# Patient Record
Sex: MALE | Race: WHITE | NOT HISPANIC OR LATINO | Employment: FULL TIME | URBAN - METROPOLITAN AREA
[De-identification: names, ages, dates, MRNs, and addresses within clinical notes are randomized per-mention and may not be internally consistent; named-entity substitution may affect disease eponyms.]

---

## 2021-01-14 ENCOUNTER — OFFICE VISIT (OUTPATIENT)
Dept: FAMILY MEDICINE CLINIC | Facility: CLINIC | Age: 56
End: 2021-01-14
Payer: COMMERCIAL

## 2021-01-14 VITALS
TEMPERATURE: 97.1 F | BODY MASS INDEX: 25.93 KG/M2 | HEART RATE: 47 BPM | DIASTOLIC BLOOD PRESSURE: 80 MMHG | HEIGHT: 71 IN | SYSTOLIC BLOOD PRESSURE: 120 MMHG | WEIGHT: 185.2 LBS | OXYGEN SATURATION: 98 %

## 2021-01-14 DIAGNOSIS — Z12.5 PROSTATE CANCER SCREENING: ICD-10-CM

## 2021-01-14 DIAGNOSIS — Z00.00 ANNUAL PHYSICAL EXAM: Primary | ICD-10-CM

## 2021-01-14 DIAGNOSIS — Z11.4 ENCOUNTER FOR SCREENING FOR HIV: ICD-10-CM

## 2021-01-14 DIAGNOSIS — E78.2 MIXED HYPERLIPIDEMIA: ICD-10-CM

## 2021-01-14 DIAGNOSIS — M54.12 CERVICAL RADICULOPATHY: ICD-10-CM

## 2021-01-14 DIAGNOSIS — Z11.59 NEED FOR HEPATITIS C SCREENING TEST: ICD-10-CM

## 2021-01-14 PROBLEM — Z98.890 H/O MELANOMA EXCISION: Status: ACTIVE | Noted: 2021-01-14

## 2021-01-14 PROBLEM — Z85.820 H/O MELANOMA EXCISION: Status: ACTIVE | Noted: 2021-01-14

## 2021-01-14 PROBLEM — Z80.8 FAMILY HISTORY OF SKIN CANCER: Status: ACTIVE | Noted: 2021-01-14

## 2021-01-14 PROCEDURE — 99386 PREV VISIT NEW AGE 40-64: CPT | Performed by: FAMILY MEDICINE

## 2021-01-14 PROCEDURE — 3008F BODY MASS INDEX DOCD: CPT | Performed by: FAMILY MEDICINE

## 2021-01-14 PROCEDURE — 1036F TOBACCO NON-USER: CPT | Performed by: FAMILY MEDICINE

## 2021-01-14 PROCEDURE — 3725F SCREEN DEPRESSION PERFORMED: CPT | Performed by: FAMILY MEDICINE

## 2021-01-14 RX ORDER — METAXALONE 800 MG/1
800 TABLET ORAL 3 TIMES DAILY
Qty: 90 TABLET | Refills: 0 | Status: SHIPPED | OUTPATIENT
Start: 2021-01-14 | End: 2021-05-05

## 2021-01-14 NOTE — PROGRESS NOTES
[address] [facility] [address]    NAME: Deric Menon  AGE: 54 y o  SEX: male  : 1965     DATE: 2021     Assessment and Plan:     Problem List Items Addressed This Visit        Other    Hyperlipidemia    Relevant Orders    Lipid Panel with Direct LDL reflex      Other Visit Diagnoses     Annual physical exam    -  Primary    Relevant Orders    Comprehensive metabolic panel    CBC and differential    Encounter for screening for HIV        Relevant Orders    HIV 1/2 Antigen/Antibody (4th Generation) w Reflex [facility]    Need for hepatitis C screening test        Relevant Orders    Hepatitis C antibody    Prostate cancer screening        Relevant Orders    PSA, total and free    Cervical radiculopathy        Relevant Medications    metaxalone (SKELAXIN) 800 mg tablet    Other Relevant Orders    XR spine cervical complete 4 or 5 vw non injury          Immunizations and preventive care screenings were discussed with patient today  Appropriate education was printed on patient's after visit summary  Counseling:  Alcohol/drug use: discussed moderation in alcohol intake, the recommendations for healthy alcohol use, and avoidance of illicit drug use  Dental Health: discussed importance of regular tooth brushing, flossing, and dental visits  Injury prevention: discussed safety/seat belts, safety helmets, smoke detectors, carbon dioxide detectors, and smoking near bedding or upholstery  Sexual health: discussed sexually transmitted diseases, partner selection, use of condoms, avoidance of unintended pregnancy, and contraceptive alternatives  · Exercise: the importance of regular exercise/physical activity was discussed  Recommend exercise 3-5 times per week for at least 30 minutes  BMI Counseling: Body mass index is 25 61 kg/m²   The BMI is above normal  Nutrition recommendations include decreasing portion sizes and encouraging healthy choices of fruits and vegetables  Exercise recommendations include moderate physical activity 150 minutes/week  No pharmacotherapy was ordered  No follow-ups on file  Chief Complaint:     Chief Complaint   Patient presents with    Physical Exam     Patient is here today for a new patient annual exam       History of Present Illness:     Adult Annual Physical   Patient here for a comprehensive physical exam  The patient reports no problems   [employer]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  · Diet/Nutrition: well balanced diet and consuming 3-5 servings of fruits/vegetables daily  · Exercise: biking stationary   Skiing,      Depression Screening  PHQ-9 Depression Screening    PHQ-9:   Frequency of the following problems over the past two weeks:      Little interest or pleasure in doing things: 0 - not at all  Feeling down, depressed, or hopeless: 0 - not at all  PHQ-2 Score: 0       General Health  · Sleep: sleeps well and gets 7-8 hours of sleep on average  · Hearing: normal - bilateral   · Vision: most recent eye exam <1 year ago and wears glasses  · Dental: regular dental visits and brushes teeth twice daily   Health  · Symptoms include: none     Review of Systems:     Review of Systems   Constitutional: Negative  HENT: Negative  Eyes: Negative  Respiratory: Negative  Cardiovascular: Negative  Gastrointestinal: Negative  Endocrine: Negative  Genitourinary: Negative  Musculoskeletal: Negative  Skin: Negative  Allergic/Immunologic: Negative  Neurological: Negative  Hematological: Negative  Psychiatric/Behavioral: Negative  Past Medical History:     History reviewed  No pertinent past medical history  Past Surgical History:     History reviewed  No pertinent surgical history     Family History:     Family History   Problem Relation Age of Onset    Cancer Father     Heart disease Father     Hypertension Father    Yvette Lee Hyperlipidemia Father     Hypertension Brother       Social History:        Social History     Socioeconomic History    Marital status: /Civil Union     Spouse name: None    Number of children: None    Years of education: None    Highest education level: None   Occupational History    None   Social Needs    Financial resource strain: None    Food insecurity     Worry: None     Inability: None    Transportation needs     Medical: None     Non-medical: None   Tobacco Use    Smoking status: Never Smoker    Smokeless tobacco: Never Used   Substance and Sexual Activity    Alcohol use: Yes     Comment: social    Drug use: Never    Sexual activity: Yes   Lifestyle    Physical activity     Days per week: None     Minutes per session: None    Stress: None   Relationships    Social connections     Talks on phone: None     Gets together: None     Attends Islam service: None     Active member of club or organization: None     Attends meetings of clubs or organizations: None     Relationship status: None    Intimate partner violence     Fear of current or ex partner: None     Emotionally abused: None     Physically abused: None     Forced sexual activity: None   Other Topics Concern    None   Social History Narrative    None      Current Medications:     Current Outpatient Medications   Medication Sig Dispense Refill    metaxalone (SKELAXIN) 800 mg tablet Take 1 tablet (800 mg total) by mouth 3 (three) times a day 90 tablet 0     No current facility-administered medications for this visit  Allergies: Allergies   Allergen Reactions    Amoxicillin-Pot Clavulanate Rash    Penicillins Rash      Physical Exam:     /80 (BP Location: Left arm, Patient Position: Sitting, Cuff Size: Adult)   Pulse (!) 47   Temp (!) 97 1 °F (36 2 °C) (Tympanic)   Ht 5' 11 3" (1 811 m)   Wt 84 kg (185 lb 3 2 oz)   SpO2 98%   BMI 25 61 kg/m²     Physical Exam  Vitals signs and nursing note reviewed  Constitutional:       Appearance: He is well-developed  HENT:      Head: Normocephalic and atraumatic  Eyes:      Conjunctiva/sclera: Conjunctivae normal    Neck:      Musculoskeletal: Neck supple  Cardiovascular:      Rate and Rhythm: Normal rate and regular rhythm  Heart sounds: No murmur  Pulmonary:      Effort: Pulmonary effort is normal  No respiratory distress  Breath sounds: Normal breath sounds  Abdominal:      Palpations: Abdomen is soft  Tenderness: There is no abdominal tenderness  Musculoskeletal: Normal range of motion  Skin:     General: Skin is warm and dry  Neurological:      Mental Status: He is alert            Maritza Elliott MD  [address] [facility]

## 2021-01-14 NOTE — PATIENT INSTRUCTIONS

## 2021-01-18 ENCOUNTER — APPOINTMENT (OUTPATIENT)
Dept: LAB | Facility: CLINIC | Age: 56
End: 2021-01-18
Payer: COMMERCIAL

## 2021-01-18 ENCOUNTER — TRANSCRIBE ORDERS (OUTPATIENT)
Dept: FAMILY MEDICINE CLINIC | Facility: HOSPITAL | Age: 56
End: 2021-01-18

## 2021-01-18 ENCOUNTER — TELEPHONE (OUTPATIENT)
Dept: ADMINISTRATIVE | Facility: OTHER | Age: 56
End: 2021-01-18

## 2021-01-18 DIAGNOSIS — E78.2 MIXED HYPERLIPIDEMIA: ICD-10-CM

## 2021-01-18 DIAGNOSIS — Z12.5 PROSTATE CANCER SCREENING: ICD-10-CM

## 2021-01-18 DIAGNOSIS — Z11.4 ENCOUNTER FOR SCREENING FOR HIV: ICD-10-CM

## 2021-01-18 DIAGNOSIS — Z11.59 NEED FOR HEPATITIS C SCREENING TEST: ICD-10-CM

## 2021-01-18 LAB
ALBUMIN SERPL BCP-MCNC: 3.8 G/DL (ref 3.5–5)
ALP SERPL-CCNC: 47 U/L (ref 46–116)
ALT SERPL W P-5'-P-CCNC: 30 U/L (ref 12–78)
ANION GAP SERPL CALCULATED.3IONS-SCNC: 9 MMOL/L (ref 4–13)
AST SERPL W P-5'-P-CCNC: 23 U/L (ref 5–45)
BASOPHILS # BLD AUTO: 0.04 THOUSANDS/ΜL (ref 0–0.1)
BASOPHILS NFR BLD AUTO: 1 % (ref 0–1)
BILIRUB SERPL-MCNC: 1.64 MG/DL (ref 0.2–1)
BUN SERPL-MCNC: 19 MG/DL (ref 5–25)
CALCIUM SERPL-MCNC: 8.8 MG/DL (ref 8.3–10.1)
CHLORIDE SERPL-SCNC: 105 MMOL/L (ref 100–108)
CHOLEST SERPL-MCNC: 244 MG/DL (ref 50–200)
CO2 SERPL-SCNC: 27 MMOL/L (ref 21–32)
CREAT SERPL-MCNC: 1.05 MG/DL (ref 0.6–1.3)
EOSINOPHIL # BLD AUTO: 0.21 THOUSAND/ΜL (ref 0–0.61)
EOSINOPHIL NFR BLD AUTO: 5 % (ref 0–6)
ERYTHROCYTE [DISTWIDTH] IN BLOOD BY AUTOMATED COUNT: 12.7 % (ref 11.6–15.1)
GFR SERPL CREATININE-BSD FRML MDRD: 80 ML/MIN/1.73SQ M
GLUCOSE P FAST SERPL-MCNC: 97 MG/DL (ref 65–99)
HCT VFR BLD AUTO: 47 % (ref 36.5–49.3)
HCV AB SER QL: NORMAL
HDLC SERPL-MCNC: 55 MG/DL
HGB BLD-MCNC: 16 G/DL (ref 12–17)
IMM GRANULOCYTES # BLD AUTO: 0.01 THOUSAND/UL (ref 0–0.2)
IMM GRANULOCYTES NFR BLD AUTO: 0 % (ref 0–2)
LDLC SERPL CALC-MCNC: 172 MG/DL (ref 0–100)
LYMPHOCYTES # BLD AUTO: 1.87 THOUSANDS/ΜL (ref 0.6–4.47)
LYMPHOCYTES NFR BLD AUTO: 45 % (ref 14–44)
MCH RBC QN AUTO: 32.4 PG (ref 26.8–34.3)
MCHC RBC AUTO-ENTMCNC: 34 G/DL (ref 31.4–37.4)
MCV RBC AUTO: 95 FL (ref 82–98)
MONOCYTES # BLD AUTO: 0.52 THOUSAND/ΜL (ref 0.17–1.22)
MONOCYTES NFR BLD AUTO: 13 % (ref 4–12)
NEUTROPHILS # BLD AUTO: 1.47 THOUSANDS/ΜL (ref 1.85–7.62)
NEUTS SEG NFR BLD AUTO: 36 % (ref 43–75)
NRBC BLD AUTO-RTO: 0 /100 WBCS
PLATELET # BLD AUTO: 241 THOUSANDS/UL (ref 149–390)
PMV BLD AUTO: 9.6 FL (ref 8.9–12.7)
POTASSIUM SERPL-SCNC: 4.1 MMOL/L (ref 3.5–5.3)
PROT SERPL-MCNC: 6.9 G/DL (ref 6.4–8.2)
RBC # BLD AUTO: 4.94 MILLION/UL (ref 3.88–5.62)
SODIUM SERPL-SCNC: 141 MMOL/L (ref 136–145)
TRIGL SERPL-MCNC: 85 MG/DL
WBC # BLD AUTO: 4.12 THOUSAND/UL (ref 4.31–10.16)

## 2021-01-18 PROCEDURE — 80061 LIPID PANEL: CPT

## 2021-01-18 PROCEDURE — 80053 COMPREHEN METABOLIC PANEL: CPT | Performed by: FAMILY MEDICINE

## 2021-01-18 PROCEDURE — 84153 ASSAY OF PSA TOTAL: CPT

## 2021-01-18 PROCEDURE — 36415 COLL VENOUS BLD VENIPUNCTURE: CPT | Performed by: FAMILY MEDICINE

## 2021-01-18 PROCEDURE — 86803 HEPATITIS C AB TEST: CPT

## 2021-01-18 PROCEDURE — 84154 ASSAY OF PSA FREE: CPT

## 2021-01-18 PROCEDURE — 85025 COMPLETE CBC W/AUTO DIFF WBC: CPT | Performed by: FAMILY MEDICINE

## 2021-01-18 PROCEDURE — 87389 HIV-1 AG W/HIV-1&-2 AB AG IA: CPT

## 2021-01-18 NOTE — LETTER
Procedure Request Form: Colonoscopy      Date Requested: 21  Patient: Radha Merchant  Patient : 1965   Referring Provider: Reeves Justice, MD        Date of Procedure ______________________________       The above patient has informed us that they have completed their   most recent Colonoscopy at your facility  Please complete   this form and attach all corresponding procedure reports/results  Comments __________________________________________________________  ____________________________________________________________________  ____________________________________________________________________  ____________________________________________________________________    Facility Completing Procedure _________________________________________    Form Completed By (print name) _______________________________________      Signature __________________________________________________________      These reports are needed for  compliance    Please fax this completed form and a copy of the procedure report to our office located at [place] [address] as soon as possible to [phone] haris Angel: Phone [phone]    We thank you for your assistance in treating our mutual patient

## 2021-01-18 NOTE — TELEPHONE ENCOUNTER
Upon review of the In Basket request and the patient's chart, initial outreach has been made via fax, please see Contacts section for details       Thank you  Rosio Lo MA

## 2021-01-18 NOTE — TELEPHONE ENCOUNTER
----- Message from Christian Jones sent at 1/14/2021 12:35 PM EST -----  01/14/21 12:36 PM    Hello, our patient Valentina Sandoval has had a Colorectal Cancer Screening completed/performed  Please assist in updating the patient chart by reaching out to Dr Kiran Juarez office  Office address is [address] [place] [address] and telephone number is [phone]      Thank you,  Christian Jones  [facility]

## 2021-01-19 LAB — HIV 1+2 AB+HIV1 P24 AG SERPL QL IA: NORMAL

## 2021-01-19 NOTE — TELEPHONE ENCOUNTER
Upon review of the In Basket request we were able to locate, review, and update the patient chart as requested for CRC: Colonoscopy  Any additional questions or concerns should be emailed to the Practice Liaisons via [email or web address] email, please do not reply via In Basket      Thank you  Viktoriya Lawrence MA

## 2021-01-20 DIAGNOSIS — D70.9 NEUTROPENIA, UNSPECIFIED TYPE (HCC): ICD-10-CM

## 2021-01-20 DIAGNOSIS — R74.8 ELEVATED LIVER ENZYMES: Primary | ICD-10-CM

## 2021-01-20 LAB
PSA FREE MFR SERPL: 36.7 %
PSA FREE SERPL-MCNC: 0.55 NG/ML
PSA SERPL-MCNC: 1.5 NG/ML (ref 0–4)

## 2021-01-21 ENCOUNTER — APPOINTMENT (OUTPATIENT)
Dept: RADIOLOGY | Age: 56
End: 2021-01-21
Payer: COMMERCIAL

## 2021-01-21 DIAGNOSIS — M54.12 CERVICAL RADICULOPATHY: ICD-10-CM

## 2021-01-21 PROCEDURE — 72050 X-RAY EXAM NECK SPINE 4/5VWS: CPT

## 2021-01-29 DIAGNOSIS — M54.12 CERVICAL RADICULOPATHY: Primary | ICD-10-CM

## 2021-03-03 ENCOUNTER — TRANSCRIBE ORDERS (OUTPATIENT)
Dept: PAIN MEDICINE | Facility: CLINIC | Age: 56
End: 2021-03-03

## 2021-03-03 ENCOUNTER — CONSULT (OUTPATIENT)
Dept: PAIN MEDICINE | Facility: CLINIC | Age: 56
End: 2021-03-03
Payer: COMMERCIAL

## 2021-03-03 VITALS
BODY MASS INDEX: 25.59 KG/M2 | WEIGHT: 185 LBS | SYSTOLIC BLOOD PRESSURE: 98 MMHG | DIASTOLIC BLOOD PRESSURE: 70 MMHG | HEART RATE: 56 BPM

## 2021-03-03 DIAGNOSIS — R29.898 LEFT HAND WEAKNESS: ICD-10-CM

## 2021-03-03 DIAGNOSIS — M50.120 CERVICAL DISC DISORDER WITH RADICULOPATHY OF MID-CERVICAL REGION: Primary | ICD-10-CM

## 2021-03-03 PROCEDURE — 99244 OFF/OP CNSLTJ NEW/EST MOD 40: CPT | Performed by: ANESTHESIOLOGY

## 2021-03-03 PROCEDURE — 1036F TOBACCO NON-USER: CPT | Performed by: ANESTHESIOLOGY

## 2021-03-03 RX ORDER — NAPROXEN 500 MG/1
500 TABLET ORAL 2 TIMES DAILY WITH MEALS
Qty: 30 TABLET | Refills: 0 | Status: SHIPPED | OUTPATIENT
Start: 2021-03-03 | End: 2021-05-05
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
## 2021-03-03 NOTE — PATIENT INSTRUCTIONS
Neck Exercises   WHAT YOU NEED TO KNOW:   Why is it important to do neck exercises? Neck exercises help reduce neck pain, and improve neck movement and strength  Neck exercises also help prevent long-term neck problems  What do I need to know about neck exercises? · Do the exercises every day,  or as often as directed by your healthcare provider  · Move slowly, gently, and smoothly  Avoid fast or jerky motions  · Stand and sit the way your healthcare provider shows you  Good posture may reduce your neck pain  Check your posture often, even when you are not doing your neck exercises  How do I perform neck exercises safely? · Exercise position:  You may sit or stand while you do neck exercises  Face forward  Your shoulders should be straight and relaxed, with a good posture  · Head tilts, forward and back:  Gently bow your head and try to touch your chin to your chest  Your healthcare provider may tell you to push on the back of your neck to help bow your head  Raise your chin back to the starting position  Tilt your head back as far as possible so you are looking up at the ceiling  Your healthcare provider may tell you to lift your chin to help tilt your head back  Return your head to the starting position  · Head tilts, side to side:  Tilt your head, bringing your ear toward your shoulder  Then tilt your head toward the other shoulder  · Head turns:  Turn your head to look over your shoulder  Tilt your chin down and try to touch it to your shoulder  Do not raise your shoulder to your chin  Face forward again  Do the same on the other side  · Head rolls:  Slowly bring your chin toward your chest  Next, roll your head to the right  Your ear should be positioned over your shoulder  Hold this position for 5 seconds  Roll your head back toward your chest and to the left into the same position  Hold for 5 seconds   Gently roll your head back and around in a clockwise [place] 3 times  Next, move your head in the reverse direction (counterclockwise) in a [place] 3 times  Do not shrug your shoulders upwards while you do this exercise  When should I contact my healthcare provider? · Your pain does not get better, or gets worse  · You have questions or concerns about your condition, care, or exercise program     CARE AGREEMENT:   You have the right to help plan your care  Learn about your health condition and how it may be treated  Discuss treatment options with your healthcare providers to decide what care you want to receive  You always have the right to refuse treatment  The above information is an  only  It is not intended as medical advice for individual conditions or treatments  Talk to your doctor, nurse or pharmacist before following any medical regimen to see if it is safe and effective for you  © Copyright [address] Information is for End User's use only and may not be sold, redistributed or otherwise used for commercial purposes   All illustrations and images included in CareNotes® are the copyrighted property of A D A M , Inc  or [address] [place] [address]

## 2021-03-10 ENCOUNTER — TELEPHONE (OUTPATIENT)
Dept: PAIN MEDICINE | Facility: CLINIC | Age: 56
End: 2021-03-10

## 2021-03-10 NOTE — TELEPHONE ENCOUNTER
"Received transmission on MR Machado that showed tachycardia/Atrial fib with RVR on Saturday. I called to check on MR Machado and spoke with his daughter in law. She stated [facility] had stopped taking all of his medication and he was \"talking out of his head.\" They done blood, which they stated was normal, work and gave him an enema and sent him home. He is still not well today and is still talking out of his head.Attached episode  " Already addressed in a referral task, pt aware

## 2021-03-10 NOTE — TELEPHONE ENCOUNTER
My computer  when I did the peer 2 peer  Please let him know that MRI was approved and ok to keep scheduled appt    Will call him with the results

## 2021-03-15 ENCOUNTER — EVALUATION (OUTPATIENT)
Dept: PHYSICAL THERAPY | Facility: REHABILITATION | Age: 56
End: 2021-03-15
Payer: COMMERCIAL

## 2021-03-15 ENCOUNTER — HOSPITAL ENCOUNTER (OUTPATIENT)
Dept: RADIOLOGY | Age: 56
Discharge: HOME | End: 2021-03-15
Payer: COMMERCIAL

## 2021-03-15 DIAGNOSIS — M50.120 CERVICAL DISC DISORDER WITH RADICULOPATHY OF MID-CERVICAL REGION: ICD-10-CM

## 2021-03-15 DIAGNOSIS — M50.120 CERVICAL DISC DISORDER WITH RADICULOPATHY OF MID-CERVICAL REGION: Primary | ICD-10-CM

## 2021-03-15 PROCEDURE — 72141 MRI NECK SPINE W/O DYE: CPT

## 2021-03-15 PROCEDURE — 97110 THERAPEUTIC EXERCISES: CPT

## 2021-03-15 PROCEDURE — G1004 CDSM NDSC: HCPCS

## 2021-03-15 PROCEDURE — 97161 PT EVAL LOW COMPLEX 20 MIN: CPT

## 2021-03-15 PROCEDURE — 97140 MANUAL THERAPY 1/> REGIONS: CPT

## 2021-03-15 NOTE — PROGRESS NOTES
PT Evaluation     Today's date: 3/15/2021  Patient name: Nelly Martins  : 1965  MRN: [id number]  Referring provider: Karla Mclean MD  Dx:   Encounter Diagnosis     ICD-10-CM    1  Cervical disc disorder with radiculopathy of mid-cervical region  M50 120 Ambulatory referral to Physical Therapy                  Assessment  Assessment details: Pt is a 54 y o  male who presents to PT for evaluation of L hand paresthesias  Pt reports hx of L handed N/T since Thanksiving 2020  Pt reports symptoms are primarily located on the ulnar border of the hand  He denies any neck pain or L UE pain  Pt demonstrates s/s consistent with cervical radiculopathy  He would benefit from skilled physical therapy in order to reduce impairments and maximize functional mobility  Pt was educated regarding physical therapy diagnosis and the recommended plan of care, as well as the potential risks and benefits of treatment  Impairments: impaired physical strength, lacks appropriate home exercise program and poor posture   Understanding of Dx/Px/POC: good   Prognosis: good    Goals  STG (3 weeks)  1  Pt to be I in HEP  2 Pt to reduce N/T following a night of sleep by 50%  LTG (By DC)  1 Pt to reduce paresthesias by > 75%  2  Pt to improve FOTO score to predicted dc value  3  Pt to manage symptoms independently  4  Pt to increase L  strength, triceps strength to 5/5      Plan  Patient would benefit from: skilled physical therapy  Planned therapy interventions: joint mobilization, manual therapy, neuromuscular re-education, patient education, postural training, massage, strengthening, stretching, therapeutic activities, therapeutic exercise, flexibility, functional ROM exercises, graded exercise, home exercise program, body mechanics training and activity modification  Frequency: 1x week  Duration in visits: 12  Duration in weeks: 8  Plan of Care beginning date: 3/15/2021  Plan of Care expiration date: 2021  Treatment plan discussed with: patient        Subjective    Etiology of symptoms: Pt reports onset of paresthesias on the ulnar border of the L hand  Pt reports onset of symptoms around th time of thanksgiving 2020  Patient additionally reports weakness with gripping  SINSS: Current pain 0/10  Pt reports no current pain  Aggravating factors: sleeping, turning head down and to the right; Relieving factors: new pillow  Occupation: Working, [place] of [employer] at [employer]  Exercise history: cycling 3-4x a week for 45 minutes to hour and a half    Primary functional impairments:  1  Sleeping  2   Gripping    Patient Goals:  "To get rid of the numbness and to get hand strength back "    Objective     Red Flags: Negative for night pain, unexplained weight loss, bowel or bladder dysfunction, saddle anesthesia, hx of Ca, fever, chills, N/V, changes in vision, Headaches, dizziness, gait disturbances      Neuro Screen:  Reflexes: 1+ biceps BL  Dermatomes: decreased sensation C8  Myotomes: weakness C7, C8  Ashraf's: neg    Range of Motion:    Cervical PROM  Flexion Min restriction   Extension Min restriction   R Lateral Flexion wnl   L Lateral Flexion wnl   R Rotation Min restriction   L Rotation Min restriction       Manual Muscle Testing:    Upper Quarter   Shoulder flexion BL 5/5   Shoulder extension    Shoulder abduction BL 5/5   Shoulder internal rotation    Shoulder external rotation    Elbow flexion L 4+/5 R 5/5   Elbow extension L 4+/5 R 5/5   Finger abduction / opposition 4-/5 (fifth digit) 5/5 remaining   Wrist extension BL 5/5   Wrist flexion L 4+/5 R 5/5   Wrist extension     strength L 4/5 R 5/5       Special Testing:  (+) Distraction, ULTT 3, deep neck flexor endurance test  (-) Compression, spurlings, repeated movements, cervical flexion rotation test    Joint Play:  Hypomobile C6-T7       Precautions: None      Manuals             Cervical                                                    Neuro Re-Ed DNF             Seated posture on pball             TB scap retraction on pball             Prone cervical retraction on pball                                                    Ther Ex             UBE             Chinmay bicep curl             [place] tricep press down             Putty abduction             Putty flexion             digiflex                                        Ther Activity                                       Gait Training                                       Modalities             Cervical tx

## 2021-03-18 ENCOUNTER — OFFICE VISIT (OUTPATIENT)
Dept: PHYSICAL THERAPY | Facility: REHABILITATION | Age: 56
End: 2021-03-18
Payer: COMMERCIAL

## 2021-03-18 DIAGNOSIS — M50.120 CERVICAL DISC DISORDER WITH RADICULOPATHY OF MID-CERVICAL REGION: Primary | ICD-10-CM

## 2021-03-18 PROCEDURE — 97110 THERAPEUTIC EXERCISES: CPT

## 2021-03-18 PROCEDURE — 97140 MANUAL THERAPY 1/> REGIONS: CPT

## 2021-03-18 PROCEDURE — 97112 NEUROMUSCULAR REEDUCATION: CPT

## 2021-03-18 NOTE — PROGRESS NOTES
Daily Note     Today's date: 3/18/2021  Patient name: Valentina Sandoval  : 1965  MRN: [id number]  Referring provider: Anastasiia Appiah MD  Dx:   Encounter Diagnosis     ICD-10-CM    1  Cervical disc disorder with radiculopathy of mid-cervical region  M50 120                   Subjective: Pt reports he has been compliant with HEP  States he woke up with some N/T when laying on his stomach last night  Objective: See treatment diary below      Assessment: Tolerated treatment well  Pt tolerated strength progression well without adverse effects or increase in pain  Reports improvements in neck stiffness following tx  Patient demonstrated fatigue post treatment, exhibited good technique with therapeutic exercises and would benefit from continued PT      Plan: Progress treatment as tolerated         Precautions: None      Manuals 3/18            UT stretching bl sd 4'            Cervical rot PROM sd 4'            Manual cervical tx sd 4'                         Neuro Re-Ed             DNF             Seated scap retraction on pball 5" x20 gtb            Seated cervical retraction on pball 5" x20            Prone cervical retraction on pball                                                    Ther Ex             UBE 3' / 3'            [place] bicep curl 12# 3x10            [place] tricep press down 10# 3x10            Putty abduction / ext 2x2' red            Putty flexion 2x1' red            digiflex                                        Ther Activity                                       Gait Training                                       Modalities             Cervical tx

## 2021-03-19 ENCOUNTER — TELEPHONE (OUTPATIENT)
Dept: PAIN MEDICINE | Facility: CLINIC | Age: 56
End: 2021-03-19

## 2021-03-19 NOTE — TELEPHONE ENCOUNTER
Left voice mail to go over MRI cervical spine results which shows disc bulging at C6-7 with mild stenosis but also disc bulge with left foraminal disc protrusion at C7-T1 which is likely etiology of the left hand symptoms  Depending on how he is doing, I will either have him continue physical therapy or proceed with a cervical epidural steroid injection

## 2021-03-22 ENCOUNTER — APPOINTMENT (OUTPATIENT)
Dept: PHYSICAL THERAPY | Facility: REHABILITATION | Age: 56
End: 2021-03-22
Payer: COMMERCIAL

## 2021-03-22 NOTE — TELEPHONE ENCOUNTER
S/w pt, advised of FQ's notation  Pt verbalized understanding  Pt states he has had slow but consistent improvement in symptoms, numbness in hand has decreased and cramping is intermittent  Pt states if FQ recommends , he would like to continue PT and hold off on an injection at this time  Please advise, thank you

## 2021-03-24 ENCOUNTER — OFFICE VISIT (OUTPATIENT)
Dept: PHYSICAL THERAPY | Facility: REHABILITATION | Age: 56
End: 2021-03-24
Payer: COMMERCIAL

## 2021-03-24 DIAGNOSIS — M50.120 CERVICAL DISC DISORDER WITH RADICULOPATHY OF MID-CERVICAL REGION: Primary | ICD-10-CM

## 2021-03-24 PROCEDURE — 97012 MECHANICAL TRACTION THERAPY: CPT

## 2021-03-24 PROCEDURE — 97112 NEUROMUSCULAR REEDUCATION: CPT

## 2021-03-24 PROCEDURE — 97110 THERAPEUTIC EXERCISES: CPT

## 2021-03-24 NOTE — PROGRESS NOTES
Daily Note     Today's date: 3/24/2021  Patient name: Chey Montemayor  : 1965  MRN: [id number]  Referring provider: Sharlene Rudd MD  Dx:   Encounter Diagnosis     ICD-10-CM    1  Cervical disc disorder with radiculopathy of mid-cervical region  M50 120                   Subjective: Pt reports numbness in L hand this visit pre tx  Objective: See treatment diary below      Assessment: Tolerated treatment well  Pt reports no change in numbness following tx  Pt demonstrates improved strength this visit  Patient exhibited good technique with therapeutic exercises and would benefit from continued PT  Plan: Progress treatment as tolerated  Precautions: None      Manuals 3/18 3/24           UT stretching bl sd 4' np           Cervical rot PROM sd 4' np           Manual cervical tx sd 4' np                        Neuro Re-Ed             DNF             Seated scap retraction on pball 5" x20 gtb 5"x20 gtb           Seated cervical retraction on pball 5" x20 5"x20           Prone cervical retraction on pball                                                    Ther Ex             UBE 3' / 3' 3' / 3'           [place] bicep curl 12# 3x10 12# 2x15           [place] tricep press down 10# 3x10 12# 2x10           Putty abduction / ext 2x2' red 2x2  5' red           Putty flexion 2x1' red 3x1' red           digiflex                                        Ther Activity                                       Gait Training                                       Modalities             Cervical tx  2x5'

## 2021-03-28 ENCOUNTER — IMMUNIZATIONS (OUTPATIENT)
Dept: FAMILY MEDICINE CLINIC | Facility: HOSPITAL | Age: 56
End: 2021-03-28

## 2021-03-28 DIAGNOSIS — Z23 ENCOUNTER FOR IMMUNIZATION: Primary | ICD-10-CM

## 2021-03-28 PROCEDURE — 91300 SARS-COV-2 / COVID-19 MRNA VACCINE (PFIZER-BIONTECH) 30 MCG: CPT

## 2021-03-28 PROCEDURE — 0001A SARS-COV-2 / COVID-19 MRNA VACCINE (PFIZER-BIONTECH) 30 MCG: CPT

## 2021-03-29 ENCOUNTER — OFFICE VISIT (OUTPATIENT)
Dept: PHYSICAL THERAPY | Facility: REHABILITATION | Age: 56
End: 2021-03-29
Payer: COMMERCIAL

## 2021-03-29 DIAGNOSIS — M50.120 CERVICAL DISC DISORDER WITH RADICULOPATHY OF MID-CERVICAL REGION: Primary | ICD-10-CM

## 2021-03-29 PROCEDURE — 97112 NEUROMUSCULAR REEDUCATION: CPT

## 2021-03-29 PROCEDURE — 97140 MANUAL THERAPY 1/> REGIONS: CPT

## 2021-03-29 PROCEDURE — 97110 THERAPEUTIC EXERCISES: CPT

## 2021-03-29 NOTE — PROGRESS NOTES
Daily Note     Today's date: 3/29/2021  Patient name: Angelique Perla  : 1965  MRN: [id number]  Referring provider: Tara Orozco MD  Dx:   Encounter Diagnosis     ICD-10-CM    1  Cervical disc disorder with radiculopathy of mid-cervical region  M50 120                   Subjective: Pt reports some soreness in arm following vaccination yesterday  States he does not have any N/T pre tx  Objective: See treatment diary below      Assessment: Tolerated treatment well  Patient demonstrated fatigue post treatment, exhibited good technique with therapeutic exercises and would benefit from continued PT      Plan: Progress treatment as tolerated  Precautions: None      Manuals 3/18 3/24 3/29          UT stretching bl sd 4' np SD 4'          Cervical rot PROM sd 4' np SD 4'          Manual cervical tx sd 4' np SD 4'                       Neuro Re-Ed             DNF             Seated scap retraction on pball 5" x20 gtb 5"x20 gtb 5"x30 gtb          Seated cervical retraction on pball 5" x20 5"x20 5"x30          Prone cervical retraction on pball                                                    Ther Ex             UBE 3' / 3' 3' / 3' 4' / 4'          [place] bicep curl 12# 3x10 12# 2x15 2x15 12#          [place] tricep press down 10# 3x10 12# 2x10 12# 2x10          Putty abduction / ext 2x2' red 2x2  5' red 2x2x5' red          Putty flexion 2x1' red 3x1' red 2x2' red          digiflex                                        Ther Activity                                       Gait Training                                       Modalities             Cervical tx  2x5'

## 2021-03-31 ENCOUNTER — OFFICE VISIT (OUTPATIENT)
Dept: PHYSICAL THERAPY | Facility: REHABILITATION | Age: 56
End: 2021-03-31
Payer: COMMERCIAL

## 2021-03-31 DIAGNOSIS — M50.120 CERVICAL DISC DISORDER WITH RADICULOPATHY OF MID-CERVICAL REGION: Primary | ICD-10-CM

## 2021-03-31 PROCEDURE — 97110 THERAPEUTIC EXERCISES: CPT

## 2021-03-31 PROCEDURE — 97112 NEUROMUSCULAR REEDUCATION: CPT

## 2021-03-31 PROCEDURE — 97140 MANUAL THERAPY 1/> REGIONS: CPT

## 2021-03-31 NOTE — PROGRESS NOTES
Daily Note     Today's date: 3/31/2021  Patient name: Nathalia Trimble  : 1965  MRN: [id number]  Referring provider: Rae Villalobos MD  Dx:   Encounter Diagnosis     ICD-10-CM    1  Cervical disc disorder with radiculopathy of mid-cervical region  M50 120                   Subjective: Pt reports symptom improvement this visit  Objective: See treatment diary below      Assessment: Tolerated treatment well  Some fatigue noted post treatment session  Patient exhibited good technique with therapeutic exercises and would benefit from continued PT      Plan: Progress treatment as tolerated  Precautions: None      Manuals 3/18 3/24 3/29 3/31         UT stretching bl sd 4' np SD 4' sd 4'         Cervical rot PROM sd 4' np SD 4' sd 4'         Manual cervical tx sd 4' np SD 4' sd 4'                      Neuro Re-Ed             DNF             Seated scap retraction on pball 5" x20 gtb 5"x20 gtb 5"x30 gtb 5"x30 gtb         Seated cervical retraction on pball 5" x20 5"x20 5"x30 5"x30         Prone cervical retraction on pball                                                    Ther Ex             UBE 3' / 3' 3' / 3' 4' / 4' 4' / 4'         [place] bicep curl 12# 3x10 12# 2x15 2x15 12# 2x15 12#         [place] tricep press down 10# 3x10 12# 2x10 12# 2x10 12# 2x10         Putty abduction / ext 2x2' red 2x2  5' red 2x2x5' red 2x2  5' red         Putty flexion 2x1' red 3x1' red 2x2' red 2x2' red         digiflex                                        Ther Activity                                       Gait Training                                       Modalities             Cervical tx  2x5'

## 2021-04-05 ENCOUNTER — OFFICE VISIT (OUTPATIENT)
Dept: PHYSICAL THERAPY | Facility: REHABILITATION | Age: 56
End: 2021-04-05
Payer: COMMERCIAL

## 2021-04-05 DIAGNOSIS — M50.120 CERVICAL DISC DISORDER WITH RADICULOPATHY OF MID-CERVICAL REGION: Primary | ICD-10-CM

## 2021-04-05 PROCEDURE — 97112 NEUROMUSCULAR REEDUCATION: CPT

## 2021-04-05 PROCEDURE — 97140 MANUAL THERAPY 1/> REGIONS: CPT

## 2021-04-05 PROCEDURE — 97110 THERAPEUTIC EXERCISES: CPT

## 2021-04-05 NOTE — PROGRESS NOTES
Daily Note     Today's date: 2021  Patient name: Mary Carroll  : 1965  MRN: [id number]  Referring provider: Joann Goldstein MD  Dx:   Encounter Diagnosis     ICD-10-CM    1  Cervical disc disorder with radiculopathy of mid-cervical region  M50 120                   Subjective: Pt reports no numbness over the past week  Reports strength in hands is improving, gradually  Objective: See treatment diary below      Assessment: Tolerated treatment well  Pt tolerated strengthening progression without adverse effects  Patient demonstrated fatigue post treatment and would benefit from continued PT      Plan: Progress treatment as tolerated  Precautions: None      Manuals 3/18 3/24 3/29 3/31 45        UT stretching bl sd 4' np SD 4' sd 4' sd 4'        Cervical rot PROM sd 4' np SD 4' sd 4' sd 4'        Manual cervical tx sd 4' np SD 4' sd 4' sd 4'                     Neuro Re-Ed             DNF             Seated scap retraction on pball 5" x20 gtb 5"x20 gtb 5"x30 gtb 5"x30 gtb 5"x20 btb        Seated cervical retraction on pball 5" x20 5"x20 5"x30 5"x30 5"x30        Prone cervical retraction on pball                                                    Ther Ex             UBE 3' / 3' 3' / 3' 4' / 4' 4' / 4' 4' / 4'        [place] bicep curl 12# 3x10 12# 2x15 2x15 12# 2x15 12# 2x15 12#        [place] tricep press down 10# 3x10 12# 2x10 12# 2x10 12# 2x10 13# 2x10        Putty abduction / ext 2x2' red 2x2  5' red 2x2x5' red 2x2  5' red 2x2  5' red        Putty flexion 2x1' red 3x1' red 2x2' red 2x2' red 2x2' red        digiflex                                        Ther Activity                                       Gait Training                                       Modalities             Cervical tx  2x5'

## 2021-04-18 ENCOUNTER — IMMUNIZATIONS (OUTPATIENT)
Dept: FAMILY MEDICINE CLINIC | Facility: HOSPITAL | Age: 56
End: 2021-04-18

## 2021-04-18 DIAGNOSIS — Z23 ENCOUNTER FOR IMMUNIZATION: Primary | ICD-10-CM

## 2021-04-18 PROCEDURE — 91300 SARS-COV-2 / COVID-19 MRNA VACCINE (PFIZER-BIONTECH) 30 MCG: CPT

## 2021-04-18 PROCEDURE — 0002A SARS-COV-2 / COVID-19 MRNA VACCINE (PFIZER-BIONTECH) 30 MCG: CPT

## 2021-04-21 NOTE — PROGRESS NOTES
Pt wishes to be dc to HEP at this time  Therefore, pt to be DC at this time without formal re-evaluation

## 2021-05-05 ENCOUNTER — OFFICE VISIT (OUTPATIENT)
Dept: PAIN MEDICINE | Facility: CLINIC | Age: 56
End: 2021-05-05
Payer: COMMERCIAL

## 2021-05-05 VITALS
HEART RATE: 56 BPM | BODY MASS INDEX: 25.9 KG/M2 | RESPIRATION RATE: 16 BRPM | HEIGHT: 71 IN | WEIGHT: 185 LBS | DIASTOLIC BLOOD PRESSURE: 74 MMHG | SYSTOLIC BLOOD PRESSURE: 107 MMHG

## 2021-05-05 DIAGNOSIS — M50.13 CERVICAL DISC DISORDER WITH RADICULOPATHY OF CERVICOTHORACIC REGION: Primary | ICD-10-CM

## 2021-05-05 PROCEDURE — 3008F BODY MASS INDEX DOCD: CPT | Performed by: ANESTHESIOLOGY

## 2021-05-05 PROCEDURE — 99214 OFFICE O/P EST MOD 30 MIN: CPT | Performed by: ANESTHESIOLOGY

## 2021-05-05 PROCEDURE — 1036F TOBACCO NON-USER: CPT | Performed by: ANESTHESIOLOGY

## 2021-05-05 RX ORDER — METHYLPREDNISOLONE 4 MG/1
TABLET ORAL
Qty: 21 TABLET | Refills: 0 | Status: SHIPPED | OUTPATIENT
Start: 2021-05-05 | End: 2021-12-15 | Stop reason: ALTCHOICE

## 2021-05-05 NOTE — PROGRESS NOTES
Assessment:  1  Cervical disc disorder with radiculopathy of cervicothoracic region        Plan:  The patient is significantly improved following physical therapy but is still symptomatic in terms of left hand weakness and intermittent pain  At this time, I will order an EMG of the left upper extremity to evaluate further  I advised him I will call with the results and discuss treatment moving forward  For now, I will place him on a Medrol Dosepak for the next 6 days to help reduce swelling and inflammation  Complete risks and benefits including bleeding, infection, tissue reaction, nerve injury and allergic reaction were discussed  The approach was demonstrated using models and literature was provided  Verbal and written consent was obtained  My impressions and treatment recommendations were discussed in detail with the patient who verbalized understanding and had no further questions  Discharge instructions were provided  I personally saw and examined the patient and I agree with the above discussed plan of care  Orders Placed This Encounter   Procedures    EMG 1 Limb     Standing Status:   Future     Standing Expiration Date:   5/5/2022     Order Specific Question:   Location:     Answer:   Left upper     Order Specific Question:   Reason for Exam:     Answer:   left C8 radic     Order Specific Question:   Possible Diagnosis: Answer:   cervical neuropathy     New Medications Ordered This Visit   Medications    methylPREDNISolone 4 MG tablet therapy pack     Sig: Use as directed on package     Dispense:  21 tablet     Refill:  0       History of Present Illness:  Tara Guzman is a 64 y o  male who presents for a follow up office visit in regards to Neck Pain  The patient was last seen for initial consultation on 03/03/2021 at which time he was sent for an MRI of the cervical spine  He was also sent for physical therapy    He reports improvement about 80% in terms of strength in his left hand   He states that he does get intermittent aching in his neck as well as left hand with physical activity  He did return to bike riding which has not affected the pain at all  MRI cervical spine performed on 03/15/2021 was personally reviewed by me and with him showing disc bulging at C6-7 and left-sided disc protrusion at C7-T1      I have personally reviewed and/or updated the patient's past medical history, past surgical history, family history, social history, current medications, allergies, and vital signs today  Review of Systems   Respiratory: Negative for shortness of breath  Cardiovascular: Negative for chest pain  Gastrointestinal: Negative for constipation, diarrhea, nausea and vomiting  Musculoskeletal: Negative for arthralgias, gait problem, joint swelling and myalgias  Numbness in B/L hands   Skin: Negative for rash  Neurological: Positive for numbness  Negative for dizziness, seizures and weakness  All other systems reviewed and are negative  Patient Active Problem List   Diagnosis    Family history of heart disease    Hyperlipidemia    Family history of skin cancer    H/O melanoma excision       History reviewed  No pertinent past medical history  History reviewed  No pertinent surgical history      Family History   Problem Relation Age of Onset    Cancer Father     Heart disease Father     Hypertension Father     Hyperlipidemia Father     Hypertension Brother        Social History     Occupational History    Not on file   Tobacco Use    Smoking status: Never Smoker    Smokeless tobacco: Never Used   Substance and Sexual Activity    Alcohol use: Yes     Comment: social    Drug use: Never    Sexual activity: Yes       Current Outpatient Medications on File Prior to Visit   Medication Sig    [DISCONTINUED] metaxalone (SKELAXIN) 800 mg tablet Take 1 tablet (800 mg total) by mouth 3 (three) times a day (Patient not taking: Reported on 5/5/2021)    [DISCONTINUED] naproxen (NAPROSYN) 500 mg tablet Take 1 tablet (500 mg total) by mouth 2 (two) times a day with meals (Patient not taking: Reported on 5/5/2021)     No current facility-administered medications on file prior to visit  Allergies   Allergen Reactions    Amoxicillin-Pot Clavulanate Rash    Penicillins Rash       Physical Exam:    /74   Pulse 56   Resp 16   Ht 5' 11" (1 803 m)   Wt 83 9 kg (185 lb)   BMI 25 80 kg/m²     Constitutional:normal, well developed, well nourished, alert, in no distress and non-toxic and no overt pain behavior  Eyes:anicteric  HEENT:grossly intact  Neck:supple, symmetric, trachea midline and no masses   Pulmonary:even and unlabored  Cardiovascular:No edema or pitting edema present  Skin:Normal without rashes or lesions and well hydrated  Psychiatric:Mood and affect appropriate  Neurologic:Cranial Nerves II-XII grossly intact  Musculoskeletal:normal     Cervical Spine Exam  Appearance:  Normal lordosis  Palpation/Tenderness:  no tenderness or spasm  Range of Motion:  Flexion:  Minimally limited  with pain  Extension:  Minimally limited  with pain  Rotation - Left:  Minimally limited  with pain  Rotation - Right:  Minimally limited  with pain  Motor Strength:  Left Arm Flexion  5/5  Left Arm Extension  5/5  Right Arm Flexion  5/5  Right Arm Extension  5/5  Left Wrist Flexion  5/5  Left Wrist Extension  4/5  Left Finger Abduction  5/5  Right Finger Abduction  5/5  Left Pincer Grasp  5/5  Right Pincer Grasp  5/5  Left Pinky Grasp 4/5  Right Pinky Grasp 5/5    Imaging    Study Result    MRI CERVICAL SPINE WITHOUT CONTRAST (3/15/2021)     INDICATION: M50 120: Mid-cervical disc disorder, unspecified level     Left hand weakness      COMPARISON:  X-rays dated 1/21/2021     TECHNIQUE:  Sagittal T1, sagittal T2, sagittal inversion recovery, axial T2, axial  2D merge     IMAGE QUALITY:  Diagnostic     FINDINGS:     ALIGNMENT:  Straightening of the normal cervical lordosis  No compression fracture  No subluxation  No scoliosis      MARROW SIGNAL: There are Modic type II endplate degenerative changes  No suspicious marrow signal abnormality      CERVICAL AND VISUALIZED THORACIC CORD:  Normal signal within the visualized cord      PREVERTEBRAL AND PARASPINAL SOFT TISSUES:  Normal      VISUALIZED POSTERIOR FOSSA:  The visualized posterior fossa demonstrates no abnormal signal      CERVICAL DISC SPACES:     C2-C3:  No disc herniation, canal or foraminal stenosis      C3-C4:  No disc herniation, canal or foraminal stenosis      C4-C5:  Small disc osteophyte complex  Right uncovertebral hypertrophy  Mild canal and mild right foraminal stenosis      C5-C6:  No disc herniation, canal or foraminal stenosis      C6-C7:  Disc osteophyte complex and uncovertebral hypertrophy  Mild canal stenosis and mild foraminal stenosis      C7-T1:  Small disc bulge with left foraminal protrusion  Correlate for left C8 radiculopathy  No significant canal stenosis  Mild right foraminal stenosis      UPPER THORACIC DISC SPACES:  Normal      IMPRESSION:     Degenerative spondylosis as described with left foraminal protrusion at C7-T1   Correlate for left C8 radiculopathy

## 2021-11-05 ENCOUNTER — OFFICE VISIT (OUTPATIENT)
Dept: URGENT CARE | Age: 56
End: 2021-11-05
Payer: COMMERCIAL

## 2021-11-05 VITALS — OXYGEN SATURATION: 99 % | TEMPERATURE: 97.2 F | RESPIRATION RATE: 18 BRPM | HEART RATE: 95 BPM

## 2021-11-05 DIAGNOSIS — J02.0 STREP THROAT: Primary | ICD-10-CM

## 2021-11-05 DIAGNOSIS — J02.9 SORE THROAT: ICD-10-CM

## 2021-11-05 DIAGNOSIS — R05.9 COUGH: ICD-10-CM

## 2021-11-05 LAB — S PYO AG THROAT QL: POSITIVE

## 2021-11-05 PROCEDURE — U0003 INFECTIOUS AGENT DETECTION BY NUCLEIC ACID (DNA OR RNA); SEVERE ACUTE RESPIRATORY SYNDROME CORONAVIRUS 2 (SARS-COV-2) (CORONAVIRUS DISEASE [COVID-19]), AMPLIFIED PROBE TECHNIQUE, MAKING USE OF HIGH THROUGHPUT TECHNOLOGIES AS DESCRIBED BY CMS-2020-01-R: HCPCS | Performed by: PHYSICIAN ASSISTANT

## 2021-11-05 PROCEDURE — 87880 STREP A ASSAY W/OPTIC: CPT | Performed by: PHYSICIAN ASSISTANT

## 2021-11-05 PROCEDURE — G0382 LEV 3 HOSP TYPE B ED VISIT: HCPCS | Performed by: PHYSICIAN ASSISTANT

## 2021-11-05 PROCEDURE — U0005 INFEC AGEN DETEC AMPLI PROBE: HCPCS | Performed by: PHYSICIAN ASSISTANT

## 2021-11-05 PROCEDURE — S9083 URGENT CARE CENTER GLOBAL: HCPCS | Performed by: PHYSICIAN ASSISTANT

## 2021-11-05 RX ORDER — AZITHROMYCIN 250 MG/1
TABLET, FILM COATED ORAL
Qty: 6 TABLET | Refills: 0 | Status: SHIPPED | OUTPATIENT
Start: 2021-11-05 | End: 2021-11-09

## 2021-11-06 LAB — SARS-COV-2 RNA RESP QL NAA+PROBE: NEGATIVE

## 2021-12-15 ENCOUNTER — OFFICE VISIT (OUTPATIENT)
Dept: PAIN MEDICINE | Facility: CLINIC | Age: 56
End: 2021-12-15
Payer: COMMERCIAL

## 2021-12-15 VITALS
SYSTOLIC BLOOD PRESSURE: 122 MMHG | DIASTOLIC BLOOD PRESSURE: 70 MMHG | HEART RATE: 54 BPM | RESPIRATION RATE: 16 BRPM | HEIGHT: 71 IN | WEIGHT: 181 LBS | BODY MASS INDEX: 25.34 KG/M2

## 2021-12-15 DIAGNOSIS — M62.838 SPASM OF CERVICAL PARASPINOUS MUSCLE: ICD-10-CM

## 2021-12-15 DIAGNOSIS — M54.12 CERVICAL RADICULOPATHY: ICD-10-CM

## 2021-12-15 DIAGNOSIS — G89.4 CHRONIC PAIN SYNDROME: Primary | ICD-10-CM

## 2021-12-15 PROCEDURE — 3008F BODY MASS INDEX DOCD: CPT | Performed by: NURSE PRACTITIONER

## 2021-12-15 PROCEDURE — 99214 OFFICE O/P EST MOD 30 MIN: CPT | Performed by: NURSE PRACTITIONER

## 2021-12-15 PROCEDURE — 1036F TOBACCO NON-USER: CPT | Performed by: NURSE PRACTITIONER

## 2021-12-15 RX ORDER — METHOCARBAMOL 500 MG/1
500 TABLET, FILM COATED ORAL 4 TIMES DAILY
Qty: 180 TABLET | Refills: 1 | Status: SHIPPED | OUTPATIENT
Start: 2021-12-15 | End: 2021-12-15 | Stop reason: SDUPTHER

## 2021-12-15 RX ORDER — GABAPENTIN 300 MG/1
300 CAPSULE ORAL
Qty: 30 CAPSULE | Refills: 1 | Status: SHIPPED | OUTPATIENT
Start: 2021-12-15

## 2021-12-20 DIAGNOSIS — M62.838 SPASM OF CERVICAL PARASPINOUS MUSCLE: ICD-10-CM

## 2021-12-20 RX ORDER — METHOCARBAMOL 500 MG/1
TABLET, FILM COATED ORAL
Qty: 180 TABLET | Refills: 1 | Status: SHIPPED | OUTPATIENT
Start: 2021-12-20

## 2022-01-26 ENCOUNTER — OFFICE VISIT (OUTPATIENT)
Dept: PAIN MEDICINE | Facility: CLINIC | Age: 57
End: 2022-01-26
Payer: COMMERCIAL

## 2022-01-26 VITALS
DIASTOLIC BLOOD PRESSURE: 59 MMHG | HEIGHT: 71 IN | HEART RATE: 50 BPM | RESPIRATION RATE: 18 BRPM | WEIGHT: 183 LBS | BODY MASS INDEX: 25.62 KG/M2 | SYSTOLIC BLOOD PRESSURE: 94 MMHG

## 2022-01-26 DIAGNOSIS — M79.18 MYOFASCIAL PAIN SYNDROME: ICD-10-CM

## 2022-01-26 DIAGNOSIS — M54.12 CERVICAL RADICULOPATHY: ICD-10-CM

## 2022-01-26 DIAGNOSIS — M54.2 NECK PAIN: ICD-10-CM

## 2022-01-26 DIAGNOSIS — M25.519 TRIGGER POINT OF SHOULDER REGION, UNSPECIFIED LATERALITY: ICD-10-CM

## 2022-01-26 DIAGNOSIS — G89.4 CHRONIC PAIN SYNDROME: Primary | ICD-10-CM

## 2022-01-26 PROCEDURE — 1036F TOBACCO NON-USER: CPT | Performed by: NURSE PRACTITIONER

## 2022-01-26 PROCEDURE — 99214 OFFICE O/P EST MOD 30 MIN: CPT | Performed by: NURSE PRACTITIONER

## 2022-01-26 PROCEDURE — 3008F BODY MASS INDEX DOCD: CPT | Performed by: NURSE PRACTITIONER

## 2022-01-26 NOTE — PROGRESS NOTES
Assessment:  1  Chronic pain syndrome    2  Neck pain    3  Cervical radiculopathy    4  Myofascial pain syndrome    5  Trigger point of shoulder region, unspecified laterality        Plan:  The patient was seen in the office today for follow-up of his chronic pain secondary to neck pain with cervical radiculopathy  He was last seen in the office on 12/15/2021 and started on gabapentin 300 mg at bedtime and methocarbamol 500 mg up to 3 times daily as needed for pain  He states that he only took the gabapentin a few times and that the methocarbamol does not really help his pain symptoms even when he takes 2 pills at a time  He is still hesitant about moving forward with a cervical epidural steroid injections so at this time we will do the followin  Patient states he will restart gabapentin 300 mg at bedtime for 3 days and then increase to 600 mg at bedtime to help with his worsening radiculopathy  2  Referral to physical therapy at [facility] was provided for the patient specifically to address myofascial pain and trigger points as well as neck pain and cervical radiculopathy  3  Patient was given the contact information for Dr Bentley Sultana to try chiropractic care  4  I did discuss with the patient cervical epidural steroid injections and he was provided with an informational pamphlet  I also discussed with him possible trigger point injections to the upper trapezius muscles  However, patient wishes to hold off on injections at this time  My impressions and treatment recommendations were discussed in detail with the patient who verbalized understanding and had no further questions  Discharge instructions were provided  I personally saw and examined the patient and I agree with the above discussed plan of care      Orders Placed This Encounter   Procedures    Ambulatory referral to Physical Therapy     Standing Status:   Future     Standing Expiration Date:   2023 Referral Priority:   Routine     Referral Type:   Physical Therapy     Referral Reason:   Specialty Services Required     Requested Specialty:   Physical Therapy     Number of Visits Requested:   1     Expiration Date:   1/26/2023     No orders of the defined types were placed in this encounter  History of Present Illness:  Chester Seymour is a 64 y o  male who presents for a follow up office visit in regards to Neck Pain and Hand Pain (B/L)  The patients current symptoms include a pain score of 3/10 that can become quite severe especially at night when he is sleeping  His pain is intermittent and the quality is dull aching with numbness that used to be only in his left arm and hand  And now has started in his right hand  He tells me that he used to have no pain in his neck and now he has chronic pain in his neck  I have personally reviewed and/or updated the patient's past medical history, past surgical history, family history, social history, current medications, allergies, and vital signs today  Review of Systems   Respiratory: Negative for shortness of breath  Cardiovascular: Negative for chest pain  Gastrointestinal: Negative for constipation, diarrhea, nausea and vomiting  Musculoskeletal: Positive for neck pain  Negative for arthralgias, gait problem, joint swelling and myalgias  B/L Hand Pain   Skin: Negative for rash  Neurological: Negative for dizziness, seizures and weakness  All other systems reviewed and are negative  Patient Active Problem List   Diagnosis    Family history of heart disease    Hyperlipidemia    Family history of skin cancer    H/O melanoma excision       History reviewed  No pertinent past medical history  History reviewed  No pertinent surgical history      Family History   Problem Relation Age of Onset    Cancer Father     Heart disease Father     Hypertension Father     Hyperlipidemia Father     Hypertension Brother        Social History     Occupational History    Not on file   Tobacco Use    Smoking status: Never Smoker    Smokeless tobacco: Never Used   Vaping Use    Vaping Use: Never used   Substance and Sexual Activity    Alcohol use: Yes     Comment: social    Drug use: Never    Sexual activity: Yes       Current Outpatient Medications on File Prior to Visit   Medication Sig    gabapentin (NEURONTIN) 300 mg capsule Take 1 capsule (300 mg total) by mouth daily at bedtime    methocarbamol (ROBAXIN) 500 mg tablet Take 1 tablet (500 mg) by mouth up to 4 times daily as needed for muscle spasms  May take 2 tablets (1000 mg) if one isn't effective  No current facility-administered medications on file prior to visit  Allergies   Allergen Reactions    Amoxicillin-Pot Clavulanate Rash    Penicillins Rash       Physical Exam:    BP 94/59   Pulse (!) 50   Resp 18   Ht 5' 11" (1 803 m)   Wt 83 kg (183 lb)   BMI 25 52 kg/m²     Constitutional:normal, well developed, well nourished, alert, in no distress and non-toxic and no overt pain behavior  Eyes:anicteric  HEENT:grossly intact  Neck:supple, symmetric, trachea midline and no masses   Pulmonary:even and unlabored  Cardiovascular:No edema or pitting edema present  Skin:Normal without rashes or lesions and well hydrated  Psychiatric:Mood and affect appropriate  Neurologic:Cranial Nerves II-XII grossly intact  Musculoskeletal:Limited range of motion due to stiffness and soreness of the neck  Tenderness with palpation over cervical paraspinals and upper trapezius muscles bilaterally with palpable trigger points      Imaging

## 2022-07-01 ENCOUNTER — OFFICE VISIT (OUTPATIENT)
Dept: URGENT CARE | Age: 57
End: 2022-07-01
Payer: COMMERCIAL

## 2022-07-01 VITALS
SYSTOLIC BLOOD PRESSURE: 114 MMHG | WEIGHT: 183 LBS | TEMPERATURE: 98 F | OXYGEN SATURATION: 98 % | HEART RATE: 72 BPM | BODY MASS INDEX: 25.52 KG/M2 | RESPIRATION RATE: 18 BRPM | DIASTOLIC BLOOD PRESSURE: 84 MMHG

## 2022-07-01 DIAGNOSIS — R21 RASH: Primary | ICD-10-CM

## 2022-07-01 PROCEDURE — G0382 LEV 3 HOSP TYPE B ED VISIT: HCPCS | Performed by: STUDENT IN AN ORGANIZED HEALTH CARE EDUCATION/TRAINING PROGRAM

## 2022-07-01 PROCEDURE — S9083 URGENT CARE CENTER GLOBAL: HCPCS | Performed by: STUDENT IN AN ORGANIZED HEALTH CARE EDUCATION/TRAINING PROGRAM

## 2022-07-01 RX ORDER — PREDNISONE 10 MG/1
10 TABLET ORAL DAILY
Qty: 21 TABLET | Refills: 0 | Status: SHIPPED | OUTPATIENT
Start: 2022-07-01

## 2022-07-01 RX ORDER — DOXYCYCLINE 100 MG/1
100 CAPSULE ORAL 2 TIMES DAILY
Qty: 28 CAPSULE | Refills: 0 | Status: SHIPPED | OUTPATIENT
Start: 2022-07-01 | End: 2022-07-15

## 2022-07-01 NOTE — PROGRESS NOTES
[facility]        NAME: Tacos Black is a 62 y o  male  : 1965    MRN: [id number]  DATE: 2022  TIME: 7:19 PM    Assessment and Plan   Rash [R21]  1  Rash  predniSONE 10 mg tablet    doxycycline monohydrate (MONODOX) 100 mg capsule         Patient Instructions       Follow up with PCP in 3-5 days  Proceed to  ER if symptoms worsen  Chief Complaint     Chief Complaint   Patient presents with    Fever    Rash     Cough , fever since 22   Rash on right arm started yesterday  Patient was positive for Flu B 1 week ago   Wheezing         History of Present Illness       HPI   Patient presents today complaining fever for the past 10 days, he has been fluid positive for about 10 10 days  Patient states that he also also went camping recently is developing circular rash on his right forearm  Patient states rash is sometimes itchy but it just bothers him  Denies any other sick contacts or travel outside the country    Review of Systems   Review of Systems  Per hpi     Current Medications       Current Outpatient Medications:     doxycycline monohydrate (MONODOX) 100 mg capsule, Take 1 capsule (100 mg total) by mouth 2 (two) times a day for 14 days, Disp: 28 capsule, Rfl: 0    predniSONE 10 mg tablet, Take 1 tablet (10 mg total) by mouth daily 6 tab day 1, 5 tab day 2, 4 tab day 3, 3 tab day 4, 2 tab day 5, 1 tab day 6, Disp: 21 tablet, Rfl: 0    gabapentin (NEURONTIN) 300 mg capsule, Take 1 capsule (300 mg total) by mouth daily at bedtime, Disp: 30 capsule, Rfl: 1    methocarbamol (ROBAXIN) 500 mg tablet, Take 1 tablet (500 mg) by mouth up to 4 times daily as needed for muscle spasms   May take 2 tablets (1000 mg) if one isn't effective , Disp: 180 tablet, Rfl: 1    Current Allergies     Allergies as of 2022 - Reviewed 2022   Allergen Reaction Noted    Amoxicillin-pot clavulanate Rash 2021    Penicillins Rash 2021            The following portions of the patient's history were reviewed and updated as appropriate: allergies, current medications, past family history, past medical history, past social history, past surgical history and problem list      History reviewed  No pertinent past medical history  History reviewed  No pertinent surgical history  Family History   Problem Relation Age of Onset    Cancer Father     Heart disease Father     Hypertension Father     Hyperlipidemia Father     Hypertension Brother          Medications have been verified  Objective   /84   Pulse 72   Temp 98 °F (36 7 °C) (Temporal)   Resp 18   Wt 83 kg (183 lb)   SpO2 98%   BMI 25 52 kg/m²   No LMP for male patient  Physical Exam     Physical Exam  Constitutional:       General: He is not in acute distress  Appearance: He is well-developed  He is not diaphoretic  HENT:      Head: Normocephalic and atraumatic  Right Ear: Tympanic membrane and external ear normal       Left Ear: Tympanic membrane and external ear normal       Mouth/Throat:      Mouth: Mucous membranes are moist       Pharynx: Oropharynx is clear  No oropharyngeal exudate  Eyes:      Extraocular Movements: Extraocular movements intact  Conjunctiva/sclera: Conjunctivae normal    Cardiovascular:      Rate and Rhythm: Normal rate and regular rhythm  Heart sounds: Normal heart sounds  Pulmonary:      Effort: Pulmonary effort is normal  No respiratory distress  Breath sounds: Normal breath sounds  No wheezing  Abdominal:      General: Bowel sounds are normal  There is no distension  Palpations: Abdomen is soft  There is no mass  Tenderness: There is no abdominal tenderness  Musculoskeletal:         General: No swelling or tenderness  Cervical back: Normal range of motion  Right lower leg: No edema  Left lower leg: No edema  Lymphadenopathy:      Cervical: No cervical adenopathy  Skin:     General: Skin is warm        Findings: Rash present  Comments: Circular erythematous rash blanching present over the right forearm, no centralized clearing   Neurological:      Mental Status: He is alert and oriented to person, place, and time

## 2024-03-19 PROCEDURE — 88304 TISSUE EXAM BY PATHOLOGIST: CPT | Performed by: STUDENT IN AN ORGANIZED HEALTH CARE EDUCATION/TRAINING PROGRAM

## 2024-03-20 ENCOUNTER — LAB REQUISITION (OUTPATIENT)
Dept: LAB | Facility: HOSPITAL | Age: 59
End: 2024-03-20
Payer: COMMERCIAL

## 2024-03-20 DIAGNOSIS — D48.5 NEOPLASM OF UNCERTAIN BEHAVIOR OF SKIN: ICD-10-CM

## 2024-03-22 PROCEDURE — 88304 TISSUE EXAM BY PATHOLOGIST: CPT | Performed by: STUDENT IN AN ORGANIZED HEALTH CARE EDUCATION/TRAINING PROGRAM

## 2024-03-25 ENCOUNTER — TELEPHONE (OUTPATIENT)
Dept: FAMILY MEDICINE CLINIC | Facility: CLINIC | Age: 59
End: 2024-03-25

## 2024-03-25 NOTE — PROGRESS NOTES
1. Lateral epicondylitis of right elbow  Ambulatory Referral to Occupational Therapy    [facility]      2. Pain in right elbow  CANCELED: XR elbow 3+ vw right        Orders Placed This Encounter   Procedures    Ambulatory Referral to Occupational Therapy    [facility]          ASSESSMENT/PLAN:  Right Elbow Late Epicondylitis  AMMY: skiing flicking wrist   professor Mayo    Repeat X-ray next visit: None    Return if symptoms worsen or fail to improve.    Patient Instructions   Tennis elbow (lateral epicondylitis) or its cousin, Golfer’s elbow (medial epicondyltitis), are irritations and inflammations of the anchor points of your forearm muscles at your elbow. Tennis elbow is usually caused by overuse, extending your wrist (pushing wrist up) and golfer’s elbow by flexion of the wrist (pushing wrist down). This takes at least 6 weeks to heal and usually up to 3 months to see complete resolution. Injections help to reduce pain significantly but the pain will return if you do not perform physical therapy. The curative treatment is physical therapy. Injections, tennis elbow straps, and anti-inflammatories only help to reduce pain. This is a clinical diagnosis and may require xray but MRI is usually not considered until at least 3 months of failed treatment.  Surgery is generally reserved for cases which do not improve after 6 months of conservative treatment. (RONNIE Bonilla 2017).      Theraband flexbar  Begin with red bar, advance to green bar after 3-4 weeks  8-12 reps for 9 sets every other day      Educated risks of mixing NSAIDS ( (non-steroidal anti-inflammatory pills including advil, ibuprofen, motrin, meloxicam, celecoxib, aleve, naproxen, and aspirin containing products) with each other or with steroids (such as prednisone, medrol). Explained risks of mixing these medications including stomach ulcer, severe internal bleeding, and kidney failure. Instructed not to take  "NSAIDS if have history of stomach ulcers, kidney issues, or uncontrolled hypertension. Instructed patient to use only one brand as prescribed. For naproxen, a maximum of 500 mg per dose every 12 hours and no more than two doses or 1,000mg per day. For Ibuprofen, a maximum of 800 mg per dose every 6 hours but no more than 3 doses or 2,400 mg per day. Never take these medications together. Never take these medications the same day. For severe pain and only if you have no liver problems, you may add Tylenol (also known as acetaminophen) maximum of 1,000  Mg per dose every 6 hours but no more 3 doses or 3,000 mg per day. Patient expressed understanding and agreed to plan.            __________________________________________________________________________    HISTORY OF PRESENT ILLNESS:    Right atraumatic lateral elbow ongoing 2-3 months. Began when skiing and extending his wrist repeatedly. Today 3/10 pain.        Review of Systems  Review of Systems     Following history reviewed and update:    No past medical history on file.  No past surgical history on file.  Social History   Social History     Substance and Sexual Activity   Alcohol Use Yes    Comment: social     Social History     Substance and Sexual Activity   Drug Use Never     Social History     Tobacco Use   Smoking Status Never   Smokeless Tobacco Never     Family History   Problem Relation Age of Onset    Cancer Father     Heart disease Father     Hypertension Father     Hyperlipidemia Father     Hypertension Brother      Allergies   Allergen Reactions    Amoxicillin-Pot Clavulanate Rash    Penicillins Rash          /72 (BP Location: Left arm, Patient Position: Sitting, Cuff Size: Standard)   Ht 5' 11\" (1.803 m)   Wt 83 kg (183 lb)   BMI 25.52 kg/m²       Right Elbow Exam     Tenderness   The patient is experiencing tenderness in the lateral epicondyle (+mild tenderness radial tunnel).     Range of Motion   The patient has normal right elbow " ROM.    Muscle Strength   The patient has normal right elbow strength.    Other   Erythema: absent  Scars: absent  Sensation: normal    Comments:  Chief complaint of pain reproduced at lateral epicondyle with resisted isometric contraction of wrist and finger extensors          __________________________________________________________________________  Procedures

## 2024-03-26 ENCOUNTER — OFFICE VISIT (OUTPATIENT)
Dept: OBGYN CLINIC | Facility: OTHER | Age: 59
End: 2024-03-26
Payer: COMMERCIAL

## 2024-03-26 VITALS
BODY MASS INDEX: 25.62 KG/M2 | HEIGHT: 71 IN | WEIGHT: 183 LBS | SYSTOLIC BLOOD PRESSURE: 104 MMHG | DIASTOLIC BLOOD PRESSURE: 72 MMHG

## 2024-03-26 DIAGNOSIS — M25.521 PAIN IN RIGHT ELBOW: ICD-10-CM

## 2024-03-26 DIAGNOSIS — M77.11 LATERAL EPICONDYLITIS OF RIGHT ELBOW: Primary | ICD-10-CM

## 2024-03-26 PROCEDURE — 99203 OFFICE O/P NEW LOW 30 MIN: CPT | Performed by: FAMILY MEDICINE

## 2024-03-26 RX ORDER — ROSUVASTATIN CALCIUM 5 MG/1
5 TABLET, COATED ORAL DAILY
COMMUNITY
Start: 2024-03-18

## 2024-03-26 NOTE — PATIENT INSTRUCTIONS
Tennis elbow (lateral epicondylitis) or its cousin, Golfer’s elbow (medial epicondyltitis), are irritations and inflammations of the anchor points of your forearm muscles at your elbow. Tennis elbow is usually caused by overuse, extending your wrist (pushing wrist up) and golfer’s elbow by flexion of the wrist (pushing wrist down). This takes at least 6 weeks to heal and usually up to 3 months to see complete resolution. Injections help to reduce pain significantly but the pain will return if you do not perform physical therapy. The curative treatment is physical therapy. Injections, tennis elbow straps, and anti-inflammatories only help to reduce pain. This is a clinical diagnosis and may require xray but MRI is usually not considered until at least 3 months of failed treatment.  Surgery is generally reserved for cases which do not improve after 6 months of conservative treatment. (RONNIE Bonilla 2017).      Theraband flexbar  Begin with red bar, advance to green bar after 3-4 weeks  8-12 reps for 9 sets every other day      Educated risks of mixing NSAIDS ( (non-steroidal anti-inflammatory pills including advil, ibuprofen, motrin, meloxicam, celecoxib, aleve, naproxen, and aspirin containing products) with each other or with steroids (such as prednisone, medrol). Explained risks of mixing these medications including stomach ulcer, severe internal bleeding, and kidney failure. Instructed not to take NSAIDS if have history of stomach ulcers, kidney issues, or uncontrolled hypertension. Instructed patient to use only one brand as prescribed. For naproxen, a maximum of 500 mg per dose every 12 hours and no more than two doses or 1,000mg per day. For Ibuprofen, a maximum of 800 mg per dose every 6 hours but no more than 3 doses or 2,400 mg per day. Never take these medications together. Never take these medications the same day. For severe pain and only if you have no liver problems, you may add Tylenol (also known as  acetaminophen) maximum of 1,000  Mg per dose every 6 hours but no more 3 doses or 3,000 mg per day. Patient expressed understanding and agreed to plan.

## 2024-04-01 ENCOUNTER — EVALUATION (OUTPATIENT)
Dept: PHYSICAL THERAPY | Facility: OTHER | Age: 59
End: 2024-04-01

## 2024-04-01 DIAGNOSIS — M25.521 RIGHT ELBOW PAIN: Primary | ICD-10-CM

## 2024-04-01 PROCEDURE — 97161 PT EVAL LOW COMPLEX 20 MIN: CPT | Performed by: PHYSICAL THERAPIST

## 2024-04-01 NOTE — PROGRESS NOTES
PT Evaluation     Today's date: 2024  Patient name: Rc Escalona  : 1965  MRN: [id number]  Referring provider: Demetrius Aldrich*  Dx: No diagnosis found.               Assessment  Assessment details: Rc Escalona is a pleasant 58 y.o. male who presents with R elbow pain, started  24 while skijng he reported bracing with his pole sharp pain lateral elbow. Pain with gripping.  Jose nwith removing cap from a white board pen. He reported pain with sleepign at night and IADL;s pain is all the time. It is becoming more consisitent in terms of pain at rest and with light actiivtyes. Discussed EPAT (shockwave treatment) the patient was agreeable to treatment  We will utilize EPAT for 4-6 treatments and they understand the full benefits may take up to 4-6 week after last treatment.      HEP issued and POC reviewed.     Impairments: abnormal coordination, abnormal muscle firing, abnormal or restricted ROM, activity intolerance, impaired physical strength, lacks appropriate home exercise program, pain with function and poor body mechanics    Symptom irritability: moderateUnderstanding of Dx/Px/POC: good   Prognosis: good    Goals  Short Term:  Pt will report decreased levels of pain by at least 2 subjective ratings in 4 weeks  Pt will demonstrate improved ROM by at least 10 degrees in 4 weeks  Pt will demonstrate improved strength by 1/2 grade  Long Term:   Pt will be independent in their HEP in 8 weeks  Pt will be be pain free with IADL's  Pt will demonstrate improved FOTO, > 80         Plan  Plan details: Prognosis above is given PT services 2x/week tapering to 1x/week over the next 3 months and home program adherence.  Patient would benefit from: skilled physical therapy  Planned modality interventions: thermotherapy: hydrocollator packs  Planned therapy interventions: activity modification, joint mobilization, manual therapy, motor coordination training, neuromuscular re-education, patient  education, self care, therapeutic activities, therapeutic exercise, graded activity and home exercise program  Frequency: 2x week  Treatment plan discussed with: patient      Subjective Evaluation    Patient Goals  Patient goals for therapy: decreased pain, independence with ADLs/IADLs, return to sport/leisure activities, increased motion and increased strength    Pain  At best pain rating: 3  At worst pain ratin      Objective     General Comments:      Elbow Comments    40 L 100  increased to 80 R sided with MWM lateal glide ulna. And pain reduced     TTP over the lateral epi     + 3rd digit ext and wrist ext.     No laxity noted .            Precautions:   POC expires Unit limit Auth Expiration date PT/OT + Visit Limit?   6.1.24 1unit max                              V        Manuals             MWM lateral glide with gripping              EPAT  2400 2.0 red head                                       Neuro Re-Ed             Digi              Theraweb              Eccetrics flex bar              Tb GH              TB bicep and tricep.                                        Ther Ex             Wrist flexor and extensor stretch                                                                                                         Ther Activity                                       Gait Training                                       Modalities

## 2024-04-09 ENCOUNTER — OFFICE VISIT (OUTPATIENT)
Dept: PHYSICAL THERAPY | Facility: OTHER | Age: 59
End: 2024-04-09

## 2024-04-09 DIAGNOSIS — M25.521 RIGHT ELBOW PAIN: Primary | ICD-10-CM

## 2024-04-09 PROCEDURE — 97140 MANUAL THERAPY 1/> REGIONS: CPT | Performed by: PHYSICAL THERAPIST

## 2024-04-09 NOTE — PROGRESS NOTES
Daily Note     Today's date: 2024  Patient name: Rc Escalona  : 1965  MRN: [id number]  Referring provider: Demetrius Aldrich  Dx: No diagnosis found.               Subjective: pain more in the Triceps region today. Adjusted HEP. Did not progress as much because of the triceps being flared up.       Objective: See treatment diary below      Assessment: Tolerated treatment well. Patient demonstrated fatigue post treatment      Plan: Continue per plan of care.      Precautions:   POC expires Unit limit Auth Expiration date PT/OT + Visit Limit?   6.1.24 1unit max                              V        Manuals 4.12            MWM lateral glide with gripping  MJ             EPAT  2400 18 hz 2.0 red head  and 1500 1.8 bars tricep 18 hz                                       Neuro Re-Ed             Digi              Theraweb              Eccetrics flex bar  NP             Tb GH              TB bicep and tricep.              Eccentrics with DB  #3 10x3                          Ther Ex             Wrist flexor and extensor stretch  10''x10             Ube              OH tricep stretch  Against wall 10''x10             T-S ext over chair                                                                  Ther Activity                                       Gait Training                                       Modalities

## 2024-04-15 ENCOUNTER — OFFICE VISIT (OUTPATIENT)
Dept: PHYSICAL THERAPY | Facility: OTHER | Age: 59
End: 2024-04-15

## 2024-04-15 DIAGNOSIS — M25.521 RIGHT ELBOW PAIN: Primary | ICD-10-CM

## 2024-04-15 PROCEDURE — 97140 MANUAL THERAPY 1/> REGIONS: CPT | Performed by: PHYSICAL THERAPIST

## 2024-04-15 NOTE — PROGRESS NOTES
"Daily Note     Today's date: 4/15/2024  Patient name: Rc Escalona  : 1965  MRN: [id number]  Referring provider: Demetrius Aldrich  Dx:   Encounter Diagnosis     ICD-10-CM    1. Right elbow pain  M25.521                      Subjective: The patient reports improved tricep pain following last visit. He notes his pain is now primarily in the wrist extensors.       Objective: See treatment diary below      Assessment: Tolerated treatment well with appropriate fatigue noted with exercises. Shifted focus from triceps to wrist extensors due to reports of improved pain. Favorable response to EPAT with improved tolerance and decreased tightness/pain noted following treatment.       Plan: Continue per plan of care.      Precautions:   POC expires Unit limit Auth Expiration date PT/OT + Visit Limit?   6.1.24 1unit max                              V        Manuals 4.12 4.15           MWM lateral glide with gripping  MJ  NB           EPAT  2400 18 hz 2.0 red head  and 1500 1.8 bars tricep 18 hz  2400 18 hx 2.0 red head and 2000 2.0 bars tricep 18 hz                                     Neuro Re-Ed             Digi              Theraweb   20x ea           Eccetrics flex bar  NP  Green 20x           Tb GH              TB bicep and tricep.              Eccentrics with DB  #3 10x3  #3 10x3                        Ther Ex             Wrist flexor and extensor stretch  10''x10  10\"x10           Ube              OH tricep stretch  Against wall 10''x10  Against wall 10\"x10           T-S ext over chair                                                                  Ther Activity                                       Gait Training                                       Modalities                                            "

## 2024-04-23 ENCOUNTER — OFFICE VISIT (OUTPATIENT)
Dept: PHYSICAL THERAPY | Facility: OTHER | Age: 59
End: 2024-04-23

## 2024-04-23 DIAGNOSIS — M25.521 RIGHT ELBOW PAIN: Primary | ICD-10-CM

## 2024-04-23 PROCEDURE — 97140 MANUAL THERAPY 1/> REGIONS: CPT | Performed by: PHYSICAL THERAPIST

## 2024-04-24 NOTE — PROGRESS NOTES
"Daily Note     Today's date: 2024  Patient name: Rc Escalona  : 1965  MRN: [id number]  Referring provider: Demetrius Aldrich  Dx: No diagnosis found.               Subjective: trciep region better today. Added seccetrincs into supination.       Objective: See treatment diary below      Assessment: Tolerated treatment well. Patient demonstrated fatigue post treatment      Plan: Continue per plan of care.      Precautions:   POC expires Unit limit Auth Expiration date PT/OT + Visit Limit?   6.1.24 1unit max                              V        Manuals 4.12 4.15 4.24          MWM lateral glide with gripping  MJ  NB MJ           EPAT  2400 18 hz 2.0 red head  and 1500 1.8 bars tricep 18 hz  2400 18 hx 2.0 red head and 2000 2.0 bars tricep 18 hz 2400 18hz 2.2 cermic head                                     Neuro Re-Ed             Digi              Theraweb   20x ea 20x           Eccetrics flex bar  NP  Green 20x           Tb GH              TB bicep and tricep.              Eccentrics with DB  #3 10x3  #3 10x3 #4 10x3           Eccentric supnation   #1 10x2          Ther Ex             Wrist flexor and extensor stretch  10''x10  10\"x10 10''x10           Ube              OH tricep stretch  Against wall 10''x10  Against wall 10\"x10 19'x10           T-S ext over chair                                                                  Ther Activity                                       Gait Training                                       Modalities                                              "

## 2024-04-30 ENCOUNTER — OFFICE VISIT (OUTPATIENT)
Dept: PHYSICAL THERAPY | Facility: OTHER | Age: 59
End: 2024-04-30

## 2024-04-30 DIAGNOSIS — M25.521 RIGHT ELBOW PAIN: Primary | ICD-10-CM

## 2024-04-30 PROCEDURE — 97140 MANUAL THERAPY 1/> REGIONS: CPT | Performed by: PHYSICAL THERAPIST

## 2024-04-30 NOTE — PROGRESS NOTES
"Daily Note     Today's date: 2024  Patient name: Rc Escalona  : 1965  MRN: [id number]  Referring provider: Demetrius Aldrich  Dx:   Encounter Diagnosis     ICD-10-CM    1. Right elbow pain  M25.521                      Subjective: trciep region better today. Added seccetrincs into supination.       Objective: See treatment diary below      Assessment: Tolerated treatment well. Patient demonstrated fatigue post treatment      Plan: Continue per plan of care.      Precautions:   POC expires Unit limit Auth Expiration date PT/OT + Visit Limit?   6.1.24 1unit max                              V        Manuals 4.12 4.15 4.30          MWM lateral glide with gripping  MJ  NB MJ           EPAT  2400 18 hz 2.0 red head  and 1500 1.8 bars tricep 18 hz  2400 18 hx 2.0 red head and 2000 2.0 bars tricep 18 hz 2400 18hz 2.2 cermic head                                     Neuro Re-Ed             Digi              Theraweb   20x ea 20x           Eccetrics flex bar  NP  Green 20x           Tb GH              TB bicep and tricep.              Eccentrics with DB  #3 10x3  #3 10x3 #4 10x3           Eccentric supnation   #1 10x2          Ther Ex             Wrist flexor and extensor stretch  10''x10  10\"x10 10''x10           Ube              OH tricep stretch  Against wall 10''x10  Against wall 10\"x10 19'x10           T-S ext over chair                                                                  Ther Activity                                       Gait Training                                       Modalities                                              "

## 2024-05-06 ENCOUNTER — OFFICE VISIT (OUTPATIENT)
Dept: PHYSICAL THERAPY | Facility: OTHER | Age: 59
End: 2024-05-06

## 2024-05-06 DIAGNOSIS — M25.521 RIGHT ELBOW PAIN: Primary | ICD-10-CM

## 2024-05-06 PROCEDURE — 97140 MANUAL THERAPY 1/> REGIONS: CPT | Performed by: PHYSICAL THERAPIST

## 2024-05-06 NOTE — PROGRESS NOTES
"Daily Note     Today's date: 2024  Patient name: Rc Escalona  : 1965  MRN: [id number]  Referring provider: Demetrius Aldrich  Dx:   Encounter Diagnosis     ICD-10-CM    1. Right elbow pain  M25.521                      Subjective: trciep region better today. Added seccetrincs into supination.       Objective: See treatment diary below      Assessment: Tolerated treatment well. Patient demonstrated fatigue post treatment      Plan: Continue per plan of care.      Precautions:   POC expires Unit limit Auth Expiration date PT/OT + Visit Limit?   6.1.24 1unit max                              V        Manuals 4.12 4.15 5.6          MWM lateral glide with gripping  MJ  NB MJ           EPAT  2400 18 hz 2.0 red head  and 1500 1.8 bars tricep 18 hz  2400 18 hx 2.0 red head and 2000 2.0 bars tricep 18 hz 2400 18hz 2.5cermic head                                     Neuro Re-Ed             Digi              Theraweb   20x ea 20x           Eccetrics flex bar  NP  Green 20x           Ecc bicep curl   Gtb 10x2           TB bicep and tricep.              Eccentrics with DB  #3 10x3  #3 10x3 #4 10x3           Eccentric supnation   #1 10x2          Ther Ex             Wrist flexor and extensor stretch  10''x10  10\"x10 10''x10           Ube              OH tricep stretch  Against wall 10''x10  Against wall 10\"x10 19'x10           T-S ext over chair                                                                  Ther Activity                                       Gait Training                                       Modalities                                              "

## 2024-05-13 ENCOUNTER — OFFICE VISIT (OUTPATIENT)
Dept: PHYSICAL THERAPY | Facility: OTHER | Age: 59
End: 2024-05-13

## 2024-05-13 DIAGNOSIS — M25.521 RIGHT ELBOW PAIN: Primary | ICD-10-CM

## 2024-05-13 PROCEDURE — 97140 MANUAL THERAPY 1/> REGIONS: CPT | Performed by: PHYSICAL THERAPIST

## 2024-05-13 NOTE — PROGRESS NOTES
Daily Note     Today's date: 2024  Patient name: Rc Escalona  : 1965  MRN: [id number]  Referring provider: Demetrius Aldrich*  Dx: No diagnosis found.               Subjective: improvements noted with resistance and tolerated activities he has been able ot be more agressive with exercises at home. Less pain at rest.     Objective: See treatment diary below      Assessment: Tolerated treatment well. Patient demonstrated fatigue post treatment      Plan: Continue per plan of care.      Precautions:   POC expires Unit limit Auth Expiration date PT/OT + Visit Limit?   6.1.24 1unit max                              V        Manuals 5.13          MWM lateral glide with gripping  MJ           EPAT  2400 18hz 3.7cermic head                                 Neuro Re-Ed           Digi            Theraweb  20x NP           Eccetrics flex bar  NP           Ecc bicep curl Gtb 10x2           TB bicep and tricep.  Eccentic btb 10x3           Eccentrics with DB  #510x3           Eccentric supnation #5 10x2          Ther Ex           Wrist flexor and extensor stretch  10''x10           Ube            OH tricep stretch  19'x10           T-S ext over chair                                                        Ther Activity                                 Gait Training                                 Modalities

## 2024-05-28 ENCOUNTER — OFFICE VISIT (OUTPATIENT)
Dept: PHYSICAL THERAPY | Facility: OTHER | Age: 59
End: 2024-05-28

## 2024-05-28 DIAGNOSIS — M25.521 RIGHT ELBOW PAIN: Primary | ICD-10-CM

## 2024-05-28 PROCEDURE — 97140 MANUAL THERAPY 1/> REGIONS: CPT | Performed by: PHYSICAL THERAPIST

## 2024-05-28 NOTE — PROGRESS NOTES
Daily Note     Today's date: 2024  Patient name: Rc Escalona  : 1965  MRN: [id number]  Referring provider: Demetrius Aldrich  Dx:   Encounter Diagnosis     ICD-10-CM    1. Right elbow pain  M25.521                      Subjective: overall doing very well mild TTP only.  now equal pain free. He ius able to do most IADL's pain free. Hand shaking and brushing teeth 2 activities that will still reproduce pain.     We discussed a tenative D/C he will reach out if not progressing as expected for a few more visits if needed.       Objective: See treatment diary below      Assessment: Tolerated treatment well. Patient demonstrated fatigue post treatment      Plan: Continue per plan of care.  D/C      Precautions:   POC expires Unit limit Auth Expiration date PT/OT + Visit Limit?   6.1.24 1unit max                              V        Manuals 5.13 5.28         MWM lateral glide with gripping  MJ  MJ          EPAT  2400 18hz 3.7cermic head  2500 3.8 cermic  head          ISTM wrist extensor  MJ                     Neuro Re-Ed           Digi            Theraweb  20x NP           Eccetrics flex bar  NP           Ecc bicep curl Gtb 10x2  btb         TB bicep and tricep.  Eccentic btb 10x3  10x3          Eccentrics with DB  #510x3  #5 10x3          Eccentric supnation #5 10x2 #5 10x2          Ther Ex           Wrist flexor and extensor stretch  10''x10  10''x10          Ube            OH tricep stretch  19'x10  10''x10          T-S ext over chair                                                        Ther Activity                                 Gait Training                                 Modalities

## 2024-10-04 ENCOUNTER — OFFICE VISIT (OUTPATIENT)
Dept: OBGYN CLINIC | Facility: OTHER | Age: 59
End: 2024-10-04
Payer: COMMERCIAL

## 2024-10-04 ENCOUNTER — APPOINTMENT (OUTPATIENT)
Dept: RADIOLOGY | Facility: OTHER | Age: 59
End: 2024-10-04
Payer: COMMERCIAL

## 2024-10-04 VITALS — BODY MASS INDEX: 25.62 KG/M2 | WEIGHT: 183 LBS | HEIGHT: 71 IN

## 2024-10-04 DIAGNOSIS — M25.521 PAIN IN RIGHT ELBOW: ICD-10-CM

## 2024-10-04 DIAGNOSIS — M77.11 LATERAL EPICONDYLITIS OF RIGHT ELBOW: Primary | ICD-10-CM

## 2024-10-04 PROCEDURE — 99213 OFFICE O/P EST LOW 20 MIN: CPT | Performed by: FAMILY MEDICINE

## 2024-10-04 PROCEDURE — 73080 X-RAY EXAM OF ELBOW: CPT

## 2024-10-04 RX ORDER — OXYCODONE AND ACETAMINOPHEN 5; 325 MG/1; MG/1
1 TABLET ORAL EVERY 8 HOURS PRN
Qty: 20 TABLET | Refills: 0 | Status: SHIPPED | OUTPATIENT
Start: 2024-10-04

## 2024-10-04 NOTE — PROGRESS NOTES
1. Lateral epicondylitis of right elbow        2. 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    Patient instructions below verbally summarized in person during encounter:  Patient Instructions   Tennis elbow (lateral epicondylitis) or its cousin, Golfer’s elbow (medial epicondyltitis), are irritations and inflammations of the anchor points of your forearm muscles at your elbow. Tennis elbow is usually caused by overuse, extending your wrist (pushing wrist up) and golfer’s elbow by flexion of the wrist (pushing wrist down). This takes at least 6 weeks to heal and usually up to 3 months to see complete resolution. Injections help to reduce pain significantly but the pain will return if you do not perform physical therapy. The curative treatment is physical therapy. Injections, tennis elbow straps, and anti-inflammatories only help to reduce pain. This is a clinical diagnosis and may require xray but MRI is usually not considered until at least 3 months of failed treatment.  Surgery is generally reserved for cases which do not improve after 6 months of conservative treatment. (RONNIE Bonilla 2017).      Theraband flexbar  Begin with red bar, advance to green bar after 3-4 weeks  8-12 reps for 9 sets every other day      PRP stands for Platelet Rich Plasma and is a Injectate solution prepared from a patient's own blood that has a high concentration of platelets. Blood is drawn from the patient in the same way that occurs  during a routine blood draw. A special machine then extracts platelet growth factors and other natural chemicals from the patient's own blood that are hypothesized to help healing tissue. PRP has been used to help healing tissues since approximately 1999, and specifically, for arthritis and cartilage problems since 2003.     Injection of PRP is recommended under ultrasound to attain visuzlization of needle and injectate into target site. Once injected, PRP begins to clot and within 10 minutes of clotting, the platelets begin to secrete their PDGFs (Platelet derived growth factors) which help to aid healing. Lab studies in petri dishes have shown that these healing factors aid in growth of tissue. There have been a number of studies to show evidence that PRP is better in humans when injected compared to placebo, but there is no definitive evidence. Specifically, PRP injection does not result in relief consistently 100% of the time and is considered experimental. As such, PRP should not be viewed as curative but as a treatment modality that may offer relief.           __________________________________________________________________________    HISTORY OF PRESENT ILLNESS:  Follow-up right elbow pain with diagnosis of epicondylitis ongoing for approximately 8 months.  This began last skiing season with patient extending his wrist repeatedly.  He has been performing home exercise program with Thera-Band flex bar as well as attended formal physical therapy visits.  Overall he is improved but still continues have persistent pain and has been unable to return to his previous level of activity including being unable to throw baseball swing hammers etc.          Review of Systems      Following history reviewed and update:    No past medical history on file.  No past surgical history on file.  Social History   Social History     Substance and Sexual Activity   Alcohol Use Yes    Comment: social     Social History  "    Substance and Sexual Activity   Drug Use Never     Social History     Tobacco Use   Smoking Status Never   Smokeless Tobacco Never     Family History   Problem Relation Age of Onset    Cancer Father     Heart disease Father     Hypertension Father     Hyperlipidemia Father     Hypertension Brother      Allergies   Allergen Reactions    Amoxicillin-Pot Clavulanate Rash    Penicillins Rash          Physical Exam  Ht 5' 11\" (1.803 m)   Wt 83 kg (183 lb)   BMI 25.52 kg/m²         Right Elbow Exam     Tenderness   The patient is experiencing tenderness in the lateral epicondyle.     Range of Motion   The patient has normal right elbow ROM.    Muscle Strength   The patient has normal right elbow strength.    Other   Erythema: absent  Scars: absent  Sensation: normal    Comments:  Chief complaint of pain reproduced at lateral epicondyle with resisted isometric contraction of wrist and finger extensors                __________________________________________________________________________  Procedures                  "

## 2024-10-04 NOTE — PATIENT INSTRUCTIONS
Tennis elbow (lateral epicondylitis) or its cousin, Golfer’s elbow (medial epicondyltitis), are irritations and inflammations of the anchor points of your forearm muscles at your elbow. Tennis elbow is usually caused by overuse, extending your wrist (pushing wrist up) and golfer’s elbow by flexion of the wrist (pushing wrist down). This takes at least 6 weeks to heal and usually up to 3 months to see complete resolution. Injections help to reduce pain significantly but the pain will return if you do not perform physical therapy. The curative treatment is physical therapy. Injections, tennis elbow straps, and anti-inflammatories only help to reduce pain. This is a clinical diagnosis and may require xray but MRI is usually not considered until at least 3 months of failed treatment.  Surgery is generally reserved for cases which do not improve after 6 months of conservative treatment. (RONNIE Bonilla 2017).      Theraband flexbar  Begin with red bar, advance to green bar after 3-4 weeks  8-12 reps for 9 sets every other day      PRP stands for Platelet Rich Plasma and is a Injectate solution prepared from a patient's own blood that has a high concentration of platelets. Blood is drawn from the patient in the same way that occurs during a routine blood draw. A special machine then extracts platelet growth factors and other natural chemicals from the patient's own blood that are hypothesized to help healing tissue. PRP has been used to help healing tissues since approximately 1999, and specifically, for arthritis and cartilage problems since 2003.     Injection of PRP is recommended under ultrasound to attain visuzlization of needle and injectate into target site. Once injected, PRP begins to clot and within 10 minutes of clotting, the platelets begin to secrete their PDGFs (Platelet derived growth factors) which help to aid healing. Lab studies in petri dishes have shown that these healing factors aid in growth of tissue.  There have been a number of studies to show evidence that PRP is better in humans when injected compared to placebo, but there is no definitive evidence. Specifically, PRP injection does not result in relief consistently 100% of the time and is considered experimental. As such, PRP should not be viewed as curative but as a treatment modality that may offer relief.

## 2024-10-21 NOTE — PATIENT INSTRUCTIONS
Today we performed ultrasound-guided tenotomy and PRP injection.  Patient to follow printed post PRP and tenotomy protocol and follow-up with physical therapy no more than 2 weeks from date of procedure.    I have also provided patient with oral opioid analgesia as there is evidence that supports avoiding anti-inflammatories when using PRP due to risk of interfering with the healing process.  Patient is using sparingly as needed only.  He may use Tylenol in addition for pain control.    Educated risks of drowsiness and sedating effects when taking prescription opiate pain pills including ultram (tramadol), vicodin (hydrocodone-acetaminophen) and percocet (oxycodone-acetaminophen) and recommended against driving or performing complex tasks while taking these medications due to risk of injury to self or others    educated risks of mixing sedating medications such as sleeping pills, benadryl (diphenhydramine), prescription opiates, muscle relaxants, alcohol, benzodiazepines such as xanax (alprazolam), ativan (lorazepam), clonazeppam, valium (diazepam) and other sedating medications including respiratory depression (stopping breathing) and death. instructed to never take these medications together.     I also explained that vicodin (hydrocodone-acetaminophen) and percocet (oxycodone-acetaminophen) is a combination pill and contains tylenol (acetaminophen). I recommend against takign tylenol in the setting of liver problems (hepatitis or other) and if does take tylenol then takeno more than a  maximum of 1,000  Mg per dose every 6 hours as needed for pain but no more 3 doses or 3,000 mg per day. If you are taking other medications which include tylenol (acetaminophen) such as hydrocodone-acetaminophen then you must factor in the amount of tylenol (acetamionphen) into your 3,000mg maximum dose.     Patient expressed understanding and agreed to plan.           Tennis elbow (lateral epicondylitis) or its cousin, Clyde  elbow (medial epicondyltitis), are irritations and inflammations of the anchor points of your forearm muscles at your elbow. Tennis elbow is usually caused by overuse, extending your wrist (pushing wrist up) and golfer’s elbow by flexion of the wrist (pushing wrist down). This takes at least 6 weeks to heal and usually up to 3 months to see complete resolution. Injections help to reduce pain significantly but the pain will return if you do not perform physical therapy. The curative treatment is physical therapy. Injections, tennis elbow straps, and anti-inflammatories only help to reduce pain. This is a clinical diagnosis and may require xray but MRI is usually not considered until at least 3 months of failed treatment.  Surgery is generally reserved for cases which do not improve after 6 months of conservative treatment. (RONNIE Bonilla 2017).      Theraband flexbar  Begin with red bar, advance to green bar after 3-4 weeks  8-12 reps for 9 sets every other day        PRP stands for Platelet Rich Plasma and is a Injectate solution prepared from a patient's own blood that has a high concentration of platelets. Blood is drawn from the patient in the same way that occurs during a routine blood draw. A special machine then extracts platelet growth factors and other natural chemicals from the patient's own blood that are hypothesized to help healing tissue. PRP has been used to help healing tissues since approximately 1999, and specifically, for arthritis and cartilage problems since 2003.      Injection of PRP is recommended under ultrasound to attain visuzlization of needle and injectate into target site. Once injected, PRP begins to clot and within 10 minutes of clotting, the platelets begin to secrete their PDGFs (Platelet derived growth factors) which help to aid healing. Lab studies in petri dishes have shown that these healing factors aid in growth of tissue. There have been a number of studies to show evidence that  PRP is better in humans when injected compared to placebo, but there is no definitive evidence. Specifically, PRP injection does not result in relief consistently 100% of the time and is considered experimental. As such, PRP should not be viewed as curative but as a treatment modality that may offer relief.     Red flags and risks of injection include but are not limited to infection <0.072% as referenced in some sources, nerve or artery penetration, and if steroids are used-skin dimpling <1%, hypo-pigmentation <1%. Recommended no submerging underwater in a tub, pool, ocean, lake, jacuzzi, hot tub, or any other body of water for 1 week until needle wound closes due to risk of infection. May take showers. Clean needle site with soap and water and keep covered at all times with sterile bandage such as a band-aid until fully healed. Educated if any symptoms including fevers, chills, swelling, or worsening symptoms occur then to call office or go to hospital for immediate care if physician unavailable due to possible infection or other complication which is a serious medical problem. Patient expressed understanding and agreed to proceed with procedure.

## 2024-10-21 NOTE — PROGRESS NOTES
1. Lateral epicondylitis of right elbow        2. 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    Patient instructions below verbally summarized in person during encounter:  Patient Instructions   Today we performed ultrasound-guided tenotomy and PRP injection.  Patient to follow printed post PRP and tenotomy protocol and follow-up with physical therapy no more than 2 weeks from date of procedure.    I have also provided patient with oral opioid analgesia as there is evidence that supports avoiding anti-inflammatories when using PRP due to risk of interfering with the healing process.  Patient is using sparingly as needed only.  He may use Tylenol in addition for pain control.    Educated risks of drowsiness and sedating effects when taking prescription opiate pain pills including ultram (tramadol), vicodin (hydrocodone-acetaminophen) and percocet (oxycodone-acetaminophen) and recommended against driving or performing complex tasks while taking these medications due to risk of injury to self or others    educated risks of mixing sedating medications such as sleeping pills, benadryl (diphenhydramine), prescription opiates, muscle relaxants, alcohol, benzodiazepines such as xanax (alprazolam), ativan (lorazepam), clonazeppam, valium (diazepam) and other sedating medications including respiratory depression (stopping breathing) and death. instructed to never take these medications together.     I also explained that vicodin (hydrocodone-acetaminophen) and percocet  (oxycodone-acetaminophen) is a combination pill and contains tylenol (acetaminophen). I recommend against takign tylenol in the setting of liver problems (hepatitis or other) and if does take tylenol then takeno more than a  maximum of 1,000  Mg per dose every 6 hours as needed for pain but no more 3 doses or 3,000 mg per day. If you are taking other medications which include tylenol (acetaminophen) such as hydrocodone-acetaminophen then you must factor in the amount of tylenol (acetamionphen) into your 3,000mg maximum dose.     Patient expressed understanding and agreed to plan.           Tennis elbow (lateral epicondylitis) or its cousin, Golfer’s elbow (medial epicondyltitis), are irritations and inflammations of the anchor points of your forearm muscles at your elbow. Tennis elbow is usually caused by overuse, extending your wrist (pushing wrist up) and golfer’s elbow by flexion of the wrist (pushing wrist down). This takes at least 6 weeks to heal and usually up to 3 months to see complete resolution. Injections help to reduce pain significantly but the pain will return if you do not perform physical therapy. The curative treatment is physical therapy. Injections, tennis elbow straps, and anti-inflammatories only help to reduce pain. This is a clinical diagnosis and may require xray but MRI is usually not considered until at least 3 months of failed treatment.  Surgery is generally reserved for cases which do not improve after 6 months of conservative treatment. (RONNIE Bonilla 2017).      Theraband flexbar  Begin with red bar, advance to green bar after 3-4 weeks  8-12 reps for 9 sets every other day        PRP stands for Platelet Rich Plasma and is a Injectate solution prepared from a patient's own blood that has a high concentration of platelets. Blood is drawn from the patient in the same way that occurs during a routine blood draw. A special machine then extracts platelet growth factors and other natural  chemicals from the patient's own blood that are hypothesized to help healing tissue. PRP has been used to help healing tissues since approximately 1999, and specifically, for arthritis and cartilage problems since 2003.      Injection of PRP is recommended under ultrasound to attain visuzlization of needle and injectate into target site. Once injected, PRP begins to clot and within 10 minutes of clotting, the platelets begin to secrete their PDGFs (Platelet derived growth factors) which help to aid healing. Lab studies in petri dishes have shown that these healing factors aid in growth of tissue. There have been a number of studies to show evidence that PRP is better in humans when injected compared to placebo, but there is no definitive evidence. Specifically, PRP injection does not result in relief consistently 100% of the time and is considered experimental. As such, PRP should not be viewed as curative but as a treatment modality that may offer relief.     Red flags and risks of injection include but are not limited to infection <0.072% as referenced in some sources, nerve or artery penetration, and if steroids are used-skin dimpling <1%, hypo-pigmentation <1%. Recommended no submerging underwater in a tub, pool, ocean, lake, jacuzzi, hot tub, or any other body of water for 1 week until needle wound closes due to risk of infection. May take showers. Clean needle site with soap and water and keep covered at all times with sterile bandage such as a band-aid until fully healed. Educated if any symptoms including fevers, chills, swelling, or worsening symptoms occur then to call office or go to hospital for immediate care if physician unavailable due to possible infection or other complication which is a serious medical problem. Patient expressed understanding and agreed to proceed with procedure.             __________________________________________________________________________    HISTORY OF PRESENT  "ILLNESS:    Patient continues to experience right elbow pain (as documented in prior Office visit from 10/4/24) with diagnosis of lateral epicondylitis ongoing for approximately 8 months.     Began last skiing season with patient extending his wrist repeatedly. He has been performing home exercise program with Thera-Band flex bar as well as attended formal physical therapy visits. Overall he is improved but still continues have persistent pain and has been unable to return to his previous level of activity including being unable to throw baseball swing hammers etc.    Current pain level today: Patient self reports that he is approximately 80% improved from onset of symptomology. On examination, patient is still uncomfortable and will endorse that maybe the number is closer to 50%.    Most notable when pouring coffe or shaking hands. Here for PRP injection of the elbow and looking to get better pain control in anticipation for skiing season.    Review of Systems      Following history reviewed and update:    No past medical history on file.  No past surgical history on file.  Social History   Social History     Substance and Sexual Activity   Alcohol Use Yes    Comment: social     Social History     Substance and Sexual Activity   Drug Use Never     Social History     Tobacco Use   Smoking Status Never   Smokeless Tobacco Never     Family History   Problem Relation Age of Onset    Cancer Father     Heart disease Father     Hypertension Father     Hyperlipidemia Father     Hypertension Brother      Allergies   Allergen Reactions    Amoxicillin-Pot Clavulanate Rash    Penicillins Rash          Physical Exam  /70 (BP Location: Left arm, Patient Position: Sitting, Cuff Size: Standard)   Ht 5' 11\" (1.803 m)   Wt 83 kg (183 lb)   BMI 25.52 kg/m²     Ortho Exam    RIGHT ELBOW:    Appearance: no overlying erythema or rashes    Flexion: 140 degrees  Extension: 0 degrees  Pronation: 80 degrees  Supination: 80 " degrees    TTP Lateral Epicondyle: +  TTP Medial Epicondyle: negative  TTP Olecranon: negative  TTP Radial Head: negative  TTP Biceps Tendon: negative    Strength:  Flexion: 5/5  Extension: 5/5  Pronation: 5/5, significant pain with this movment  Supination: 5/5    Pain with resisted wrist extension: +  Pain with resisted 3rd finger extension: +  Pain with resisted wrist flexion: negative    Varus laxity: negative  Valgus laxity: negative  Milking maneuver: negative  Moving valgus stress test: negative    Cubital tunnel Tinel's: negative    Radial/median/ulnar nerve intact    <2 sec cap refill    __________________________________________________________________________  Medium joint arthrocentesis: R elbow  Universal Protocol:  Consent: Verbal consent obtained.  Risks and benefits: risks, benefits and alternatives were discussed  Patient understanding: patient states understanding of the procedure being performed  Patient consent: the patient's understanding of the procedure matches consent given  Site marked: the operative site was marked  Radiology Images displayed and confirmed. If images not available, report reviewed: imaging studies available  Patient identity confirmed: verbally with patient  Supporting Documentation  Indications: pain   Procedure Details  Location: elbow - R elbow  Needle size: 22 G  Ultrasound guidance: yes (Linear transducer with in plane approach, sterile gel and probe cover)  Approach: anterolateral    Patient tolerance: patient tolerated the procedure well with no immediate complications  Dressing:  Sterile dressing applied    PRP injection of the right elbow for lateral epicondylitis.

## 2024-10-22 ENCOUNTER — PROCEDURE VISIT (OUTPATIENT)
Dept: OBGYN CLINIC | Facility: OTHER | Age: 59
End: 2024-10-22
Payer: COMMERCIAL

## 2024-10-22 VITALS
SYSTOLIC BLOOD PRESSURE: 110 MMHG | DIASTOLIC BLOOD PRESSURE: 70 MMHG | HEIGHT: 71 IN | WEIGHT: 183 LBS | BODY MASS INDEX: 25.62 KG/M2

## 2024-10-22 DIAGNOSIS — M25.521 PAIN IN RIGHT ELBOW: ICD-10-CM

## 2024-10-22 DIAGNOSIS — M77.11 LATERAL EPICONDYLITIS OF RIGHT ELBOW: Primary | ICD-10-CM

## 2024-10-22 PROCEDURE — 20606 DRAIN/INJ JOINT/BURSA W/US: CPT

## 2024-11-04 ENCOUNTER — EVALUATION (OUTPATIENT)
Dept: PHYSICAL THERAPY | Facility: OTHER | Age: 59
End: 2024-11-04

## 2024-11-04 DIAGNOSIS — M25.521 RIGHT ELBOW PAIN: Primary | ICD-10-CM

## 2024-11-04 PROCEDURE — 97161 PT EVAL LOW COMPLEX 20 MIN: CPT | Performed by: PHYSICAL THERAPIST

## 2024-11-04 NOTE — PROGRESS NOTES
PT Evaluation     Today's date: 2024  Patient name: Rc Escalona  : 1965  MRN: [id number]  Referring provider: Demetrius Aldrich  Dx: No diagnosis found.               Assessment  Impairments: abnormal coordination, abnormal muscle firing, abnormal or restricted ROM, activity intolerance, impaired physical strength, lacks appropriate home exercise program, pain with function and poor body mechanics  Symptom irritability: moderate    Assessment details: Rc Escalona is a pleasant 58 y.o. male who presents with R elbow pain, started  24 while skijng he reported bracing with his pole sharp pain lateral elbow. Pain with gripping.  Pain with removing cap from a white board pen. He reported pain with sleepign at night and IADL;s pain is all the time. PT was helpful gave him releif but he plateued.  He eventually had a PRP injection  on 10/22. He reproted some relaif already minimal pain or TTP. He did not yet follow up with physician he will schedule this today. We will start BFR and EPAT following the PRP protcol. Josetent is in agreement with the POC   HEP issued and POC reviewed.     Understanding of Dx/Px/POC: good     Prognosis: good    Goals  Short Term:  Pt will report decreased levels of pain by at least 2 subjective ratings in 4 weeks  Pt will demonstrate improved ROM by at least 10 degrees in 4 weeks  Pt will demonstrate improved strength by 1/2 grade  Long Term:   Pt will be independent in their HEP in 8 weeks  Pt will be be pain free with IADL's  Pt will demonstrate improved FOTO, > 80         Plan  Patient would benefit from: skilled physical therapy  Planned modality interventions: thermotherapy: hydrocollator packs    Planned therapy interventions: activity modification, joint mobilization, manual therapy, motor coordination training, neuromuscular re-education, patient education, self care, therapeutic activities, therapeutic exercise, graded activity and home exercise  program    Frequency: 2x week  Treatment plan discussed with: patient  Plan details: Prognosis above is given PT services 2x/week tapering to 1x/week over the next 3 months and home program adherence.      Subjective Evaluation    Patient Goals  Patient goals for therapy: decreased pain, independence with ADLs/IADLs, return to sport/leisure activities, increased motion and increased strength    Pain  At best pain ratin  At worst pain ratin      Objective     General Comments:      Elbow Comments    60 L 100     TTP over the lateral epi and tricep mild  more on tricep region     + 3rd digit ext and wrist ext.     No laxity noted .     MMT not tested fully.            Precautions:   POC expires Unit limit Auth Expiration date PT/OT + Visit Limit?   6.1.24 1unit max                              V        Manuals 11.4.24            MWM elbow flex   MJ             EPAT   NV                                       Neuro Re-Ed             Digi  BFR NV             Theraweb              Eccetrics flex bar  Week 4             Tb row  15x3 BFR                          Iso wrist ext  5''x3min BFR                          Ther Ex             Wrist flexor and extensor stretch  10''x10                                                                                                       Ther Activity                                       Gait Training                                       Modalities

## 2024-11-08 ENCOUNTER — OFFICE VISIT (OUTPATIENT)
Dept: PHYSICAL THERAPY | Facility: OTHER | Age: 59
End: 2024-11-08

## 2024-11-08 DIAGNOSIS — M25.521 RIGHT ELBOW PAIN: Primary | ICD-10-CM

## 2024-11-08 PROCEDURE — 97140 MANUAL THERAPY 1/> REGIONS: CPT | Performed by: PHYSICAL THERAPIST

## 2024-11-08 NOTE — PROGRESS NOTES
Daily Note     Today's date: 2024  Patient name: Rc Escalona  : 1965  MRN: [id number]  Referring provider: No ref. provider found  Dx: No diagnosis found.               Subjective: tolerated EPAT well some elbow general stiffness noted at start of PT.       Objective: See treatment diary below      Assessment: Tolerated treatment well. Patient demonstrated fatigue post treatment      Plan: Continue per plan of care.      Precautions:   POC expires Unit limit Auth Expiration date PT/OT + Visit Limit?   6.1.24 1unit max                                  Manuals 11..24 11.8           MWM elbow flex   MJ  MJ           EPAT   NV  2500                                     Neuro Re-Ed             Digi  BFR NV             Theraweb   15x3 and rubber band ext            Eccetrics flex bar  Week 4             Tb row  15x3 BFR  15x3 gtb                         Iso wrist ext  5''x3min BFR  5''x4min                         Ther Ex             Wrist flexor and extensor stretch  10''x10 10''x10                                                                                                       Ther Activity                                       Gait Training                                       Modalities

## 2024-11-11 ENCOUNTER — OFFICE VISIT (OUTPATIENT)
Dept: PHYSICAL THERAPY | Facility: OTHER | Age: 59
End: 2024-11-11

## 2024-11-11 DIAGNOSIS — M25.521 RIGHT ELBOW PAIN: Primary | ICD-10-CM

## 2024-11-11 PROCEDURE — 97140 MANUAL THERAPY 1/> REGIONS: CPT | Performed by: PHYSICAL THERAPIST

## 2024-11-11 NOTE — PROGRESS NOTES
Daily Note     Today's date: 2024  Patient name: Rc Escalona  : 1965  MRN: [id number]  Referring provider: No ref. provider found  Dx: No diagnosis found.               Subjective:       Objective: See treatment diary below      Assessment: Tolerated treatment well. Patient demonstrated fatigue post treatment      Plan: Continue per plan of care.      Precautions:   POC expires Unit limit Auth Expiration date PT/OT + Visit Limit?   6.1.24 1unit max                                  Manuals 11.4.24 11.8 11.11 11.11         MWM elbow flex   MJ  MJ MJ  MJ          EPAT   NV  2500 2500 cermic head 3.5           Cuping along wrsit ext mms belly    With ROM 30x                        Neuro Re-Ed             Digi  BFR NV   30/15/15 BFR           Theraweb   15x3 and rubber band ext  15x3 BFR           Eccetrics flex bar syart   Week 4             Tb row  15x3 BFR  15x3 gtb  15x3 gtb bfr           Flex bar concentric NV                           Iso wrist ext  5''x3min BFR  5''x4min  5''4min   break at 1min           Tricep ext    10x3 gtb           Ther Ex             Wrist flexor and extensor stretch  10''x10 10''x10  10''x10                                                                                                      Ther Activity                                       Gait Training                                       Modalities

## 2024-11-15 ENCOUNTER — OFFICE VISIT (OUTPATIENT)
Dept: PHYSICAL THERAPY | Facility: OTHER | Age: 59
End: 2024-11-15

## 2024-11-15 DIAGNOSIS — M25.521 RIGHT ELBOW PAIN: Primary | ICD-10-CM

## 2024-11-15 PROCEDURE — 97140 MANUAL THERAPY 1/> REGIONS: CPT | Performed by: PEDIATRICS

## 2024-11-15 NOTE — PROGRESS NOTES
"Daily Note     Today's date: 11/15/2024  Patient name: Rc Escalona  : 1965  MRN: [id number]  Referring provider: No ref. provider found  Dx:   Encounter Diagnosis     ICD-10-CM    1. Right elbow pain  M25.521                      Subjective: Patient states he is noting slow but steady progress.       Objective: See treatment diary below      Assessment: Tolerated treatment well. Patient demonstrated fatigue post treatment Plan to increase resistance of TB triceps NV.       Plan: Continue per plan of care.      Precautions:   POC expires Unit limit Auth Expiration date PT/OT + Visit Limit?   6..24 1unit max                                  Manuals 11.4.24 11.8 11.11 11.11         MWM elbow flex   MJ  MJ MJ  MJ          EPAT   NV  2500 2500 cermic head 3.5  2500 cermic head 3.5          Cuping along wrsit ext mms belly    With ROM 30x  With ROM 30x                       Neuro Re-Ed             Digi  BFR NV   15/15 BFR  3015/15 BFR         Theraweb   15x3 and rubber band ext  15x3 BFR  15x3  BFR         Eccetrics flex bar syart   Week 4             Tb row  15x3 BFR  15x3 gtb  15x3 gtb bfr  15x3  BTB  bfr         Flex bar concentric NV                           Iso wrist ext  5''x3min BFR  5''x4min  5''4min   break at 1min  5\" 4 min         Tricep ext    10x3 gtb  Bfr  gtb  3x15         Ther Ex             Wrist flexor and extensor stretch  10''x10 10''x10  10''x10  10\"x10                                                                                                    Ther Activity                                       Gait Training                                       Modalities                                              "

## 2024-11-18 ENCOUNTER — OFFICE VISIT (OUTPATIENT)
Dept: PHYSICAL THERAPY | Facility: OTHER | Age: 59
End: 2024-11-18

## 2024-11-18 DIAGNOSIS — M25.521 RIGHT ELBOW PAIN: Primary | ICD-10-CM

## 2024-11-18 PROCEDURE — 97140 MANUAL THERAPY 1/> REGIONS: CPT | Performed by: PHYSICAL THERAPIST

## 2024-11-18 NOTE — PROGRESS NOTES
"Daily Note     Today's date: 2024  Patient name: Rc Escalona  : 1965  MRN: [id number]  Referring provider: No ref. provider found  Dx:   Encounter Diagnosis     ICD-10-CM    1. Right elbow pain  M25.521                      Subjective: progressed to concentric with BFR today tolerated well very fatigued with Triceps ext. Ecc he will start at home. With flex bar       Objective: See treatment diary below      Assessment: Tolerated treatment well. Patient demonstrated fatigue post treatment      Plan: Continue per plan of care.      Precautions:   POC expires Unit limit Auth Expiration date PT/OT + Visit Limit?   6..24 1unit max                                  Manuals 11.4.24 11.8 11.11 11.11 11.18        MWM elbow flex   MJ  MJ MJ  MJ          EPAT   NV  2500 2500 cermic head 3.5  2500 cermic head 3.5  2500 red         Cuping along wrsit ext mms belly    With ROM 30x  With ROM 30x  NP brusing.                      Neuro Re-Ed             Digi  BFR NV   3015/15 BFR  3015/15 BFR 15x4         Theraweb   15x3 and rubber band ext  15x3 BFR  15x3  BFR 15x3         Eccetrics flex bar syart   Week 4     NV         Tb row  15x3 BFR  15x3 gtb  15x3 gtb bfr  15x3  BTB  bfr 15x3 red bfr        Flex bar concentric       10x4 BFR red                     Iso wrist ext  5''x3min BFR  5''x4min  5''4min   break at 1min  5\" 4 min D/C         Tricep ext    10x3 gtb  Bfr  gtb  3x15 rtb 15x4 bfr        Ther Ex             Wrist flexor and extensor stretch  10''x10 10''x10  10''x10  10\"x10 10''x10                                                                                                   Ther Activity                                       Gait Training                                       Modalities                                                "

## 2024-11-22 ENCOUNTER — OFFICE VISIT (OUTPATIENT)
Dept: PHYSICAL THERAPY | Facility: OTHER | Age: 59
End: 2024-11-22

## 2024-11-22 ENCOUNTER — APPOINTMENT (OUTPATIENT)
Dept: PHYSICAL THERAPY | Facility: OTHER | Age: 59
End: 2024-11-22

## 2024-11-22 DIAGNOSIS — M25.521 RIGHT ELBOW PAIN: Primary | ICD-10-CM

## 2024-11-22 PROCEDURE — 97140 MANUAL THERAPY 1/> REGIONS: CPT | Performed by: PEDIATRICS

## 2024-11-22 NOTE — PROGRESS NOTES
"Daily Note     Today's date: 2024  Patient name: Rc Escalona  : 1965  MRN: [id number]  Referring provider: Demetrius Aldrich  Dx:   Encounter Diagnosis     ICD-10-CM    1. Right elbow pain  M25.521                      Subjective: Patient continues to note slow but steady progress.      Objective: See treatment diary below      Assessment: Tolerated treatment well. Patient demonstrated fatigue post treatment Continued with ex as outlined.       Plan: Continue per plan of care.      Precautions:   POC expires Unit limit Auth Expiration date PT/OT + Visit Limit?   6.1.24 1unit max                                  Manuals 11.4.24 11.8 11.11 11.11 11.18 11.22       MWM elbow flex   MJ  MJ MJ  MJ          EPAT   NV  2500 2500 cermic head 3.5  2500 cermic head 3.5  2500 red  2500  Red         Cuping along wrsit ext mms belly    With ROM 30x  With ROM 30x  NP brusing.  2' static                    Neuro Re-Ed             Digi  BFR NV   30/15/15 BFR  30/15/15 BFR 15x4  np       Theraweb   15x3 and rubber band ext  15x3 BFR  15x3  BFR 15x3  15x3  bfr       Eccetrics flex bar syart   Week 4     NV  NV       Tb row  15x3 BFR  15x3 gtb  15x3 gtb bfr  15x3  BTB  bfr 15x3 red bfr 15x3 red bfr       Flex bar concentric       10x4 BFR red 10x4 bfr red                    Iso wrist ext  5''x3min BFR  5''x4min  5''4min   break at 1min  5\" 4 min D/C         Tricep ext    10x3 gtb  Bfr  gtb  3x15 rtb 15x4 bfr rtb 15x4 bfr       Ther Ex             Wrist flexor and extensor stretch  10''x10 10''x10  10''x10  10\"x10 10''x10 10\"x10                                                                                                  Ther Activity                                       Gait Training                                       Modalities                                                "

## 2024-11-25 ENCOUNTER — OFFICE VISIT (OUTPATIENT)
Dept: PHYSICAL THERAPY | Facility: OTHER | Age: 59
End: 2024-11-25

## 2024-11-25 DIAGNOSIS — M25.521 RIGHT ELBOW PAIN: Primary | ICD-10-CM

## 2024-11-25 PROCEDURE — 97140 MANUAL THERAPY 1/> REGIONS: CPT | Performed by: PHYSICAL THERAPIST

## 2024-11-25 NOTE — PROGRESS NOTES
"Daily Note     Today's date: 2024  Patient name: Rc Escalona  : 1965  MRN: [id number]  Referring provider: Demetrius Aldrich  Dx: No diagnosis found.               Subjective:        Objective: See treatment diary below      Assessment: Tolerated treatment well. Patient demonstrated fatigue post treatment      Plan: Continue per plan of care.      Precautions:   POC expires Unit limit Auth Expiration date PT/OT + Visit Limit?   6.1.24 1unit max                                  Manuals 11. 11.25      MWM elbow flex   MJ       EPAT  2500  Red   1 more time NV       Cuping along wrsit ext mms belly  2' static 2min and wih ROM 30x       ISTM   Wrist extansotrs and TRP triceps with elbow flexion.       Neuro Re-Ed        Digi  np       Theraweb  15x3  bfr       Eccetrics flex bar syart   NV 10x4 red /gren               Tb row  15x3 red bfr 15x4               Flex bar concentric   10x4 bfr red NP       Wall wlaks   Gtb 4 laps       Bicep curl TB  Rtb 10x3       Tricep ext  rtb 15x4 bfr Rtb 15x4       Ther Ex        Wrist flexor and extensor stretch  10\"x10 10''x10       Ube                                                         Ther Activity                        Gait Training                        Modalities                                   "

## 2024-11-29 ENCOUNTER — APPOINTMENT (OUTPATIENT)
Dept: PHYSICAL THERAPY | Facility: OTHER | Age: 59
End: 2024-11-29

## 2024-12-02 ENCOUNTER — OFFICE VISIT (OUTPATIENT)
Dept: PHYSICAL THERAPY | Facility: OTHER | Age: 59
End: 2024-12-02

## 2024-12-02 DIAGNOSIS — M25.521 RIGHT ELBOW PAIN: Primary | ICD-10-CM

## 2024-12-02 PROCEDURE — 97140 MANUAL THERAPY 1/> REGIONS: CPT | Performed by: PHYSICAL THERAPIST

## 2024-12-02 NOTE — PROGRESS NOTES
"Daily Note     Today's date: 2024  Patient name: Rc Escalona  : 1965  MRN: [id number]  Referring provider: No ref. provider found  Dx: No diagnosis found.               Subjective:       Objective: See treatment diary below      Assessment: Tolerated treatment well. Patient demonstrated fatigue post treatment      Plan: Continue per plan of care.      Precautions:   POC expires Unit limit Auth Expiration date PT/OT + Visit Limit?   6.1.24 1unit max                                  Manuals 11.22 11.25 12.2     MWM elbow flex   MJ  MJ     EPAT  2500  Red   1 more time NV  2500 red head3.5      Cuping along wrsit ext mms belly  2' static 2min and wih ROM 30x  30 ROM x2      ISTM   Wrist extansotrs and TRP triceps with elbow flexion.       Neuro Re-Ed        Digi  np       Theraweb  15x3  bfr       Eccetrics flex bar syart   NV 10x4 red /gren  10x4              Tb row  15x3 red bfr 15x4  15x4              Flex bar concentric   10x4 bfr red NP       Wall wlaks   Gtb 4 laps  Gtb 4 laps      Bicep curl TB  Rtb 10x3  Rtb 10x3      Tricep ext  rtb 15x4 bfr Rtb 15x4  Rtb 15x4     Ther Ex        Wrist flexor and extensor stretch  10\"x10 10''x10  10''x10      Ube    3retro                                                      Ther Activity                        Gait Training                        Modalities                                     "

## 2024-12-06 ENCOUNTER — OFFICE VISIT (OUTPATIENT)
Dept: PHYSICAL THERAPY | Facility: OTHER | Age: 59
End: 2024-12-06

## 2024-12-06 ENCOUNTER — OFFICE VISIT (OUTPATIENT)
Dept: OBGYN CLINIC | Facility: OTHER | Age: 59
End: 2024-12-06
Payer: COMMERCIAL

## 2024-12-06 VITALS — HEIGHT: 71 IN | BODY MASS INDEX: 25.62 KG/M2 | WEIGHT: 183 LBS

## 2024-12-06 DIAGNOSIS — M77.11 LATERAL EPICONDYLITIS OF RIGHT ELBOW: Primary | ICD-10-CM

## 2024-12-06 DIAGNOSIS — M25.521 RIGHT ELBOW PAIN: Primary | ICD-10-CM

## 2024-12-06 PROCEDURE — 99213 OFFICE O/P EST LOW 20 MIN: CPT | Performed by: FAMILY MEDICINE

## 2024-12-06 PROCEDURE — 97140 MANUAL THERAPY 1/> REGIONS: CPT | Performed by: PHYSICAL THERAPIST

## 2024-12-06 NOTE — PROGRESS NOTES
"Daily Note     Today's date: 2024  Patient name: Rc Escalona   : 1965  MRN: [id number]  Referring provider: No ref. provider found  Dx:   Encounter Diagnosis     ICD-10-CM    1. Right elbow pain  M25.521           Start Time: 1045  Stop Time: 1115  Total time in clinic (min): 30 minutes    Subjective: progressing very well. We ave completed shockwave and BFR portion of the protocol. Today we progress to higher load less reps.  He feels like he is getting beyond the point of the elbow feeling inflamed. He is better than he was prior ot the PRP injection.  He still has some lateral Epicondle pain with gripping. Pain is overall much improved.     Not all exercises performed secondary to time limitations of the patient today.     Objective: See treatment diary below      Assessment: Tolerated treatment well. Patient demonstrated fatigue post treatment      Plan: Continue per plan of care.      Precautions:   POC expires Unit limit Auth Expiration date PT/OT + Visit Limit?   6.1.24 1unit max                                  Manuals 11.22 11.25 12.2 12.5    MWM elbow flex   MJ  MJ MJ    EPAT  2500  Red   1 more time NV  2500 red head3.5  D/C     MWM elbow flex and supination  MJ MJ MJ    ISTM   Wrist extansotrs and TRP triceps with elbow flexion.   Only on forarm avoidng lateral epicondyle.     Neuro Re-Ed        Digi  np       Theraweb  15x3  bfr       Eccetrics flex bar syart   NV 10x4 red /gren  10x4  10x3 green     Flex bar pronation supination    10x2 red     Tb row  15x3 red bfr 15x4  15x4  Monitor #25 10x3     YTI Pball     NV     Flex bar concentric   10x4 bfr red NP       Wall wlaks   Gtb 4 laps  Gtb 4 laps  Btb     Bicep curl TB  Rtb 10x3  Rtb 10x3      Tricep ext  rtb 15x4 bfr Rtb 15x4  Rtb 15x4     Ther Ex        Wrist flexor and extensor stretch  10\"x10 10''x10  10''x10  10''x10     Ube    3retro  3min                                                     Ther Activity                      "   Gait Training                        Modalities

## 2024-12-06 NOTE — PROGRESS NOTES
1. Lateral epicondylitis of right elbow            No orders of the defined types were placed in this encounter.         PAST REPORTS:  X-ray right elbow 10/4/2024:  No acute osseous abnormality  No significant arthritis      ASSESSMENT/PLAN:  Right Elbow Late Epicondylitis  AMMY: skiing flicking wrist   professor Mayo    Repeat X-ray next visit: None    Return in about 6 weeks (around 1/17/2025).    Patient instructions below verbally summarized in person during encounter:  Patient Instructions   Cease ESW  Continue next phase of physical therapy  Avoid high-risk skiing slaloms. Agree with wrist brace for skiing to hold wrist neutral. __________________________________________________________________________    HISTORY OF PRESENT ILLNESS:    Visit 10/22/24:  Patient continues to experience right elbow pain (as documented in prior Office visit from 10/4/24) with diagnosis of lateral epicondylitis ongoing for approximately 8 months.     Began last skiing season with patient extending his wrist repeatedly. He has been performing home exercise program with Thera-Band flex bar as well as attended formal physical therapy visits. Overall he is improved but still continues have persistent pain and has been unable to return to his previous level of activity including being unable to throw baseball swing hammers etc.    Current pain level today: Patient self reports that he is approximately 80% improved from onset of symptomology. On examination, patient is still uncomfortable and will endorse that maybe the number is closer to 50%.    Most notable when pouring coffe or shaking hands. Here for PRP injection of the elbow and looking to get better pain control in anticipation for skiing season.    Visit 12/6/24:  Follow-up right elbow epicondylitis.  Improved overall since procedure and also compared to height of severe pain in March 2024.  Patient points to lateral condyle as well as proximal extensor  "muscle mass as source of pain.  Both of these are significant improved.  Has been compliant with physical therapy.  Denies any numbness or tingling. Pain now only 2/10 on resisted finger extension.     Review of Systems      Following history reviewed and update:    No past medical history on file.  No past surgical history on file.  Social History   Social History     Substance and Sexual Activity   Alcohol Use Yes    Comment: social     Social History     Substance and Sexual Activity   Drug Use Never     Social History     Tobacco Use   Smoking Status Never   Smokeless Tobacco Never     Family History   Problem Relation Age of Onset    Cancer Father     Heart disease Father     Hypertension Father     Hyperlipidemia Father     Hypertension Brother      Allergies   Allergen Reactions    Amoxicillin-Pot Clavulanate Rash    Penicillins Rash          Physical Exam  Ht 5' 11\" (1.803 m)   Wt 83 kg (183 lb)   BMI 25.52 kg/m²     Right Elbow Exam     Tenderness   The patient is experiencing tenderness in the lateral epicondyle (+proximal extensor mass).     Range of Motion   The patient has normal right elbow ROM.    Muscle Strength   The patient has normal right elbow strength.    Other   Erythema: absent  Scars: absent  Sensation: normal    Comments:  Chief complaint of pain reproduced at lateral epicondyle with resisted isometric contraction of wrist and finger extensors            __________________________________________________________________________  Procedures  10/22/24 Tenotomy & PRP:                            "

## 2024-12-06 NOTE — PATIENT INSTRUCTIONS
Cease ESW  Continue next phase of physical therapy  Avoid high-risk skiing slaloms. Agree with wrist brace for skiing to hold wrist neutral.

## 2024-12-09 ENCOUNTER — OFFICE VISIT (OUTPATIENT)
Dept: PHYSICAL THERAPY | Facility: OTHER | Age: 59
End: 2024-12-09

## 2024-12-09 DIAGNOSIS — M25.521 RIGHT ELBOW PAIN: Primary | ICD-10-CM

## 2024-12-09 PROCEDURE — 97140 MANUAL THERAPY 1/> REGIONS: CPT | Performed by: PHYSICAL THERAPIST

## 2024-12-09 NOTE — PROGRESS NOTES
"Daily Note     Today's date: 2024  Patient name: Rc Escalona  : 1965  MRN: [id number]  Referring provider: No ref. provider found  Dx:   Encounter Diagnosis     ICD-10-CM    1. Right elbow pain  M25.521                      Subjective: mild pain Lateral epicondle no pain with eccentrics or most exercises D/C bicep curl felt mild discomfort with this  at home using TB without handles.       Objective: See treatment diary below      Assessment: Tolerated treatment well. Patient demonstrated fatigue post treatment      Plan: Continue per plan of care.      Precautions:   POC expires Unit limit Auth Expiration date PT/OT + Visit Limit?   6.1.24 1unit max                                  Manuals 11.22 11.25 12.2 12.5 12.9   MWM elbow flex   MJ  MJ MJ MJ   EPAT  2500  Red   1 more time NV  2500 red head3.5  D/C     MWM elbow flex and supination  MJ MJ MJ MJ   ISTM   Wrist extansotrs and TRP triceps with elbow flexion.   Only on forarm avoidng lateral epicondyle.  Only formarm     Neuro Re-Ed        Digi  np       Theraweb  15x3  bfr       Eccetrics flex bar syart   NV 10x4 red /gren  10x4  10x3 green  10x3 gree    Flex bar pronation supination ecc     10x2 red  10x2 Red ecc    Tb row  15x3 red bfr 15x4  15x4  [place] #25 10x3  [place] #   YTI Pball     NV  TI 10x2 #2    Flex bar concentric   10x4 bfr red NP       Wall wlaks   Gtb 4 laps  Gtb 4 laps  Btb  Btb 4 laps    Bicep curl TB  Rtb 10x3  Rtb 10x3      Tricep ext  rtb 15x4 bfr Rtb 15x4  Rtb 15x4  [place] 10x3    Ther Ex        Wrist flexor and extensor stretch  10\"x10 10''x10  10''x10  10''x10  10'x10    Ube    3retro  3min  2/2                                                    Ther Activity                        Gait Training                        Modalities                                         "

## 2024-12-12 ENCOUNTER — OFFICE VISIT (OUTPATIENT)
Dept: PHYSICAL THERAPY | Facility: OTHER | Age: 59
End: 2024-12-12

## 2024-12-12 DIAGNOSIS — M25.521 RIGHT ELBOW PAIN: Primary | ICD-10-CM

## 2024-12-12 PROCEDURE — 97140 MANUAL THERAPY 1/> REGIONS: CPT | Performed by: PEDIATRICS

## 2024-12-12 NOTE — PROGRESS NOTES
"Daily Note     Today's date: 2024  Patient name: Rc Escalona  : 1965  MRN: [id number]  Referring provider: Demetrius Aldrich  Dx:   Encounter Diagnosis     ICD-10-CM    1. Right elbow pain  M25.521                      Subjective: Patient states he is really sore from eccentrics performed last treatment session.      Objective: See treatment diary below      Assessment: Tolerated treatment well. Patient demonstrated fatigue post treatment Held on eccentric pronation/supination today as this exacerbated pain at lateral epicondyle.       Plan: Continue per plan of care.      Precautions:   POC expires Unit limit Auth Expiration date PT/OT + Visit Limit?   6.1.24 1unit max                                  Manuals 11.22 11.25 12.2 12.5 12.9 12.12   MWM elbow flex   MJ  MJ MJ MJ MJ   EPAT  2500  Red   1 more time NV  2500 red head3.5  D/C      MWM elbow flex and supination  MJ MJ MJ MJ MJ   ISTM   Wrist extansotrs and TRP triceps with elbow flexion.   Only on forarm avoidng lateral epicondyle.  Only formarm   Only forearm     Neuro Re-Ed         Digi  np        Theraweb  15x3  bfr        Eccetrics flex bar syart   NV 10x4 red /gren  10x4  10x3 green  10x3 gree  Flexbar wrist ext and flex  10x3 red   Flex bar pronation supination ecc     10x2 red  10x2 Red ecc  Held today, resume nv   Tb row  15x3 red bfr 15x4  15x4  [place] #25 10x3  [place] # Chinmay  25#  10x3   YTI Pball     NV  TI 10x2 #2  TI 10x2 #2    Flex bar concentric   10x4 bfr red NP        Wall wlaks   Gtb 4 laps  Gtb 4 laps  Btb  Btb 4 laps  Btb 4 laps    Bicep curl TB  Rtb 10x3  Rtb 10x3       Tricep ext  rtb 15x4 bfr Rtb 15x4  Rtb 15x4  [place] 10x3  [place]  22#   Ther Ex         Wrist flexor and extensor stretch  10\"x10 10''x10  10''x10  10''x10  10'x10  10\"x10   Ube    3retro  3min  2/2  2/2                                                         Ther Activity                           Gait Training                         "   Modalities

## 2024-12-13 ENCOUNTER — APPOINTMENT (OUTPATIENT)
Dept: PHYSICAL THERAPY | Facility: OTHER | Age: 59
End: 2024-12-13

## 2024-12-16 ENCOUNTER — OFFICE VISIT (OUTPATIENT)
Dept: PHYSICAL THERAPY | Facility: OTHER | Age: 59
End: 2024-12-16

## 2024-12-16 DIAGNOSIS — M25.521 RIGHT ELBOW PAIN: Primary | ICD-10-CM

## 2024-12-16 PROCEDURE — 97140 MANUAL THERAPY 1/> REGIONS: CPT | Performed by: PHYSICAL THERAPIST

## 2024-12-16 NOTE — PROGRESS NOTES
"Daily Note     Today's date: 2024  Patient name: Rc Escalona  : 1965  MRN: [id number]  Referring provider: Demetrius Aldrich*  Dx: No diagnosis found.               Subjective: tolerated progression well.       Objective: See treatment diary below      Assessment: Tolerated treatment well. Patient demonstrated fatigue post treatment      Plan: Continue per plan of care.      Precautions:   POC expires Unit limit Auth Expiration date PT/OT + Visit Limit?   6.1.24 1unit max                                  Manuals 11.22 11.25 12.2 12.5 12.9 12.12 12.16   MWM elbow flex   MJ  MJ MJ MJ MJ MJ    EPAT  2500  Red   1 more time NV  2500 red head3.5  D/C       MWM elbow flex and supination  MJ MJ MJ MJ MJ MJ    ISTM   Wrist extansotrs and TRP triceps with elbow flexion.   Only on forarm avoidng lateral epicondyle.  Only formarm   Only forearm   Only forarm avoid Lateral spi    Neuro Re-Ed          Digi  np         Theraweb  15x3  bfr         Eccetrics flex bar syart   NV 10x4 red /gren  10x4  10x3 green  10x3 gree  Flexbar wrist ext and flex  10x3 red blue for others 10x3    Flex bar pronation supination ecc     10x2 red  10x2 Red ecc  Held today, resume nv 10x2 red    Tb row  15x3 red bfr 15x4  15x4  [place] #25 10x3  [place] # [place]  25#  10x3 #30 1-3    YTI Pball     NV  TI 10x2 #2  TI 10x2 #2  #2 10x2    Flex bar concentric   10x4 bfr red NP         Wall wlaks   Gtb 4 laps  Gtb 4 laps  Btb  Btb 4 laps  Btb 4 laps  4 laps 2x    Bicep curl TB  Rtb 10x3  Rtb 10x3        Tricep ext  rtb 15x4 bfr Rtb 15x4  Rtb 15x4  [place] 10x3  [place]  22# 10x3    Ther Ex          Wrist flexor and extensor stretch  10\"x10 10''x10  10''x10  10''x10  10'x10  10\"x10 10''x10    Ube    3retro  3min  2/2  2/2    Wall push up       Edge of table 10x2                                                      Ther Activity                              Gait Training                              Modalities                           "

## 2024-12-30 ENCOUNTER — OFFICE VISIT (OUTPATIENT)
Dept: PHYSICAL THERAPY | Facility: OTHER | Age: 59
End: 2024-12-30

## 2024-12-30 DIAGNOSIS — M25.521 RIGHT ELBOW PAIN: Primary | ICD-10-CM

## 2024-12-30 PROCEDURE — 97140 MANUAL THERAPY 1/> REGIONS: CPT | Performed by: PHYSICAL THERAPIST

## 2024-12-30 NOTE — PROGRESS NOTES
"Daily Note     Today's date: 2024  Patient name: Rc Escalona  : 1965  MRN: [id number]  Referring provider: Demetrius Aldrich  Dx:   Encounter Diagnosis     ICD-10-CM    1. Right elbow pain  M25.521                      Subjective: sore from sking 1 week ago. We discussed some adjusted ment to skiing and added a new exercise to control ecc RD       Objective: See treatment diary below      Assessment: Tolerated treatment well. Patient demonstrated fatigue post treatment      Plan: Continue per plan of care.      Precautions:   POC expires Unit limit Auth Expiration date PT/OT + Visit Limit?   6.1.24 1unit max                                  Manuals 11.22 11.25 12.2 12.5 12.9 12.12 12.16 12.30   MWM elbow flex   MJ  MJ MJ MJ MJ MJ  MJ   EPAT  2500  Red   1 more time NV  2500 red head3.5  D/C        MWM elbow flex and supination  MJ MJ MJ MJ MJ MJ  MJ   ISTM   Wrist extansotrs and TRP triceps with elbow flexion.   Only on forarm avoidng lateral epicondyle.  Only formarm   Only forearm   Only forarm avoid Lateral spi  MJ    Neuro Re-Ed           Digi  np          Theraweb  15x3  bfr          Eccetrics flex bar syart   NV 10x4 red /gren  10x4  10x3 green  10x3 gree  Flexbar wrist ext and flex  10x3 red blue for others 10x3  Blue and Rd with gren 10x3    Flex bar pronation supination ecc     10x2 red  10x2 Red ecc  Held today, resume nv 10x2 red     Tb row  15x3 red bfr 15x4  15x4  [place] #25 10x3  [place] # [place]  25#  10x3 #30 1-3  #30 10x3    YTI Pball     NV  TI 10x2 #2  TI 10x2 #2  #2 10x2  #2 10x2   Flex bar concentric   10x4 bfr red NP          Wall wlaks   Gtb 4 laps  Gtb 4 laps  Btb  Btb 4 laps  Btb 4 laps  4 laps 2x  4laps 2x   Bicep curl TB  Rtb 10x3  Rtb 10x3         Tricep ext  rtb 15x4 bfr Rtb 15x4  Rtb 15x4  [place] 10x3  [place]  22# 10x3  [place] 10x3    Ther Ex           Wrist flexor and extensor stretch  10\"x10 10''x10  10''x10  10''x10  10'x10  10\"x10 10''x10  10''x10    Ube   "  3retro  3min  2/2  2/2     Wall push up       Edge of table 10x2  10x2    Self MWM flex         5''x15                                                Ther Activity                                 Gait Training                                 Modalities

## 2025-01-09 ENCOUNTER — APPOINTMENT (OUTPATIENT)
Dept: PHYSICAL THERAPY | Facility: OTHER | Age: 60
End: 2025-01-09

## 2025-01-17 ENCOUNTER — OFFICE VISIT (OUTPATIENT)
Dept: OBGYN CLINIC | Facility: OTHER | Age: 60
End: 2025-01-17
Payer: COMMERCIAL

## 2025-01-17 ENCOUNTER — OFFICE VISIT (OUTPATIENT)
Dept: PHYSICAL THERAPY | Facility: OTHER | Age: 60
End: 2025-01-17

## 2025-01-17 VITALS — BODY MASS INDEX: 25.77 KG/M2 | WEIGHT: 180 LBS | HEIGHT: 70 IN

## 2025-01-17 DIAGNOSIS — M77.11 LATERAL EPICONDYLITIS OF RIGHT ELBOW: Primary | ICD-10-CM

## 2025-01-17 DIAGNOSIS — M25.521 RIGHT ELBOW PAIN: Primary | ICD-10-CM

## 2025-01-17 PROCEDURE — 97140 MANUAL THERAPY 1/> REGIONS: CPT | Performed by: PHYSICAL THERAPIST

## 2025-01-17 PROCEDURE — 99213 OFFICE O/P EST LOW 20 MIN: CPT | Performed by: FAMILY MEDICINE

## 2025-01-17 NOTE — PATIENT INSTRUCTIONS
I recommended patient continue with his home exercise program.  At this point in time he is significant improved but not 100% resolved.  I explained that he should expect to have even more improvement in the next weeks to months.  If he is not satisfied within the next 3 months he is to contact my office for further recommendations.  We also reviewed risk of overuse syndrome developing from resistance training and dieting strategies to help increase hypertrophy and muscle gains including increasing protein intake.

## 2025-01-17 NOTE — PROGRESS NOTES
Daily Note     Today's date: 2025  Patient name: Rc Escalona  : 1965  MRN: [id number]  Referring provider: Demetrius Aldrich  Dx:   Encounter Diagnosis     ICD-10-CM    1. Right elbow pain  M25.521                      Subjective: patient overall feels about 80% better. He is tolerating HEP and eccentrics well. He did not have any setbacks in the past ~3 weeks. He is skiing. No limitations with IADL;s. Reproduction of pain with a hard handshake pouring coffee otherwise back to most activities.     We discussed a tentative D/C from PT at this time.     Objective: See treatment diary below  Elbow Comments      110 L 100     TTP over the lateral epi and tricep mild  more on tricep region     + 3rd digit ext and wrist ext. Now only mild.     Supination mild tightness at end range. No pain with flexion/ext or Limitations in ROM       MMT full 5/5 only pain supination.     Assessment: Tolerated treatment well. Patient demonstrated fatigue post treatment      Plan: Continue per plan of care.      Precautions:   POC expires Unit limit Auth Expiration date PT/OT + Visit Limit?   6.1.24 1unit max                                  Manuals 11.22 11.25 12.2 12.5 12.9 12.12 12.16    MWM elbow flex   MJ  MJ MJ MJ MJ MJ  MJ   EPAT  2500  Red   1 more time NV  2500 red head3.5  D/C        MWM elbow flex and supination  MJ MJ MJ MJ MJ MJ  MJ   ISTM   Wrist extansotrs and TRP triceps with elbow flexion.   Only on forarm avoidng lateral epicondyle.  Only formarm   Only forearm   Only forarm avoid Lateral spi  MJ only lower forarm.    Neuro Re-Ed           Digi  np          Theraweb  15x3  bfr          Eccetrics flex bar syart   NV 10x4 red /gren  10x4  10x3 green  10x3 gree  Flexbar wrist ext and flex  10x3 red blue for others 10x3  Blue and Rd with gren 10x3    Flex bar pronation supination ecc     10x2 red  10x2 Red ecc  Held today, resume nv 10x2 red     Tb row  15x3 red bfr 15x4  15x4  [place] #25  "10x3  [place] # [place]  25#  10x3 #30 1-3  #30 10x3    YTI Pball     NV  TI 10x2 #2  TI 10x2 #2  #2 10x2  #2 10x2   Flex bar concentric   10x4 bfr red NP          Wall wlaks   Gtb 4 laps  Gtb 4 laps  Btb  Btb 4 laps  Btb 4 laps  4 laps 2x  4laps 2x   Bicep curl TB  Rtb 10x3  Rtb 10x3         Tricep ext  rtb 15x4 bfr Rtb 15x4  Rtb 15x4  [place] 10x3  [place]  22# 10x3  [place] 10x3    Ther Ex           Wrist flexor and extensor stretch  10\"x10 10''x10  10''x10  10''x10  10'x10  10\"x10 10''x10  10''x10    Ube    3retro  3min  2/2  2/2     Wall push up       Edge of table 10x2  10x2    Self MWM flex         5''x15                                                Ther Activity                                 Gait Training                                 Modalities                                                        "

## 2025-01-17 NOTE — PROGRESS NOTES
1. Lateral epicondylitis of right elbow              No orders of the defined types were placed in this encounter.         PAST REPORTS:  X-ray right elbow 10/4/2024:  No acute osseous abnormality  No significant arthritis      ASSESSMENT/PLAN:  Right Elbow Lateral Epicondylitis  85 to 90% improved compared to prior to procedure  AMMY: skiing flicking wrist   professor Mayo      Repeat X-ray next visit: None    Return if symptoms worsen or fail to improve.    Patient instructions below verbally summarized in person during encounter:  Patient Instructions   I recommended patient continue with his home exercise program.  At this point in time he is significant improved but not 100% resolved.  I explained that he should expect to have even more improvement in the next weeks to months.  If he is not satisfied within the next 3 months he is to contact my office for further recommendations.  We also reviewed risk of overuse syndrome developing from resistance training and dieting strategies to help increase hypertrophy and muscle gains including increasing protein intake.__________________________________________________________________________    HISTORY OF PRESENT ILLNESS:    Visit 10/22/24:  Patient continues to experience right elbow pain (as documented in prior Office visit from 10/4/24) with diagnosis of lateral epicondylitis ongoing for approximately 8 months.     Began last skiing season with patient extending his wrist repeatedly. He has been performing home exercise program with Thera-Band flex bar as well as attended formal physical therapy visits. Overall he is improved but still continues have persistent pain and has been unable to return to his previous level of activity including being unable to throw baseball swing hammers etc.    Current pain level today: Patient self reports that he is approximately 80% improved from onset of symptomology. On examination, patient is still  uncomfortable and will endorse that maybe the number is closer to 50%.    Most notable when pouring coffe or shaking hands. Here for PRP injection of the elbow and looking to get better pain control in anticipation for skiing season.    Visit 12/6/24:  Follow-up right elbow epicondylitis.  Improved overall since procedure and also compared to height of severe pain in March 2024.  Patient points to lateral condyle as well as proximal extensor muscle mass as source of pain.  Both of these are significant improved.  Has been compliant with physical therapy.  Denies any numbness or tingling. Pain now only 2/10 on resisted finger extension.     Visit 1/17/2025:  Right Elbow Lateral Epicondylitis  85 to 90% improved compared to prior to procedure  Patient states he has been performing significant home exercise program and has increased therapy and flex bar from green to blue resistance.  In addition he is also performing triceps pulldown as well as other upper extremity resistance training.  He has not increased his protein intake.  He notes mild occasional pain and is unable to throw a Frisbee without pain however he has doubled his  strength and he is overall satisfied with his current results.  He is agreeable to continuing with nonoperative treatment.    Review of Systems      Following history reviewed and update:    No past medical history on file.  No past surgical history on file.  Social History   Social History     Substance and Sexual Activity   Alcohol Use Yes    Comment: social     Social History     Substance and Sexual Activity   Drug Use Never     Social History     Tobacco Use   Smoking Status Never   Smokeless Tobacco Never     Family History   Problem Relation Age of Onset    Cancer Father     Heart disease Father     Hypertension Father     Hyperlipidemia Father     Hypertension Brother      Allergies   Allergen Reactions    Amoxicillin-Pot Clavulanate Rash    Penicillins Rash          Physical  "Exam  Ht 5' 10\" (1.778 m)   Wt 81.6 kg (180 lb)   BMI 25.83 kg/m²     Right Elbow Exam     Tenderness   The patient is experiencing tenderness in the lateral epicondyle.     Range of Motion   The patient has normal right elbow ROM.    Muscle Strength   The patient has normal right elbow strength.    Other   Erythema: absent  Scars: absent  Sensation: normal    Comments:  Chief complaint of pain reproduced at lateral epicondyle with resisted isometric contraction of wrist and finger extensors            __________________________________________________________________________  Procedures  10/22/24 Tenotomy & PRP:                            "

## 2025-01-17 NOTE — LETTER
January 17, 2025     Patient: Rc Escalona  YOB: 1965  Date of Visit: 1/17/2025      To Whom it May Concern:    Rc Escalona is under my professional care. Rc was seen in my office on 1/17/2025. Rc. Patient is cleared to return to gym use. I recommend return to resistance training, and aerobic conditioning.    If you have any questions or concerns, please don't hesitate to call.         Sincerely,          Demetrius Aldrich III, DO        CC: No Recipients